# Patient Record
Sex: FEMALE | Race: WHITE | NOT HISPANIC OR LATINO | Employment: OTHER | URBAN - METROPOLITAN AREA
[De-identification: names, ages, dates, MRNs, and addresses within clinical notes are randomized per-mention and may not be internally consistent; named-entity substitution may affect disease eponyms.]

---

## 2017-10-03 ENCOUNTER — HOSPITAL ENCOUNTER (INPATIENT)
Facility: HOSPITAL | Age: 82
LOS: 3 days | DRG: 391 | End: 2017-10-06
Attending: EMERGENCY MEDICINE | Admitting: FAMILY MEDICINE
Payer: MEDICARE

## 2017-10-03 DIAGNOSIS — N39.0 URINARY TRACT INFECTION WITH HEMATURIA, SITE UNSPECIFIED: ICD-10-CM

## 2017-10-03 DIAGNOSIS — R13.10 DYSPHAGIA: ICD-10-CM

## 2017-10-03 DIAGNOSIS — F03.91 DEMENTIA WITH BEHAVIORAL DISTURBANCE, UNSPECIFIED DEMENTIA TYPE: ICD-10-CM

## 2017-10-03 DIAGNOSIS — K22.0 ACHALASIA: ICD-10-CM

## 2017-10-03 DIAGNOSIS — R13.19 ESOPHAGEAL DYSPHAGIA: ICD-10-CM

## 2017-10-03 DIAGNOSIS — R06.02 SOB (SHORTNESS OF BREATH): Primary | ICD-10-CM

## 2017-10-03 DIAGNOSIS — R31.9 URINARY TRACT INFECTION WITH HEMATURIA, SITE UNSPECIFIED: ICD-10-CM

## 2017-10-03 DIAGNOSIS — G93.40 ENCEPHALOPATHY: ICD-10-CM

## 2017-10-03 DIAGNOSIS — K22.2 ESOPHAGEAL OBSTRUCTION: ICD-10-CM

## 2017-10-03 PROBLEM — I10 HTN (HYPERTENSION): Status: ACTIVE | Noted: 2017-10-03

## 2017-10-03 PROBLEM — I50.9 HEART FAILURE: Status: ACTIVE | Noted: 2017-10-03

## 2017-10-03 PROBLEM — F03.90 DEMENTIA: Status: ACTIVE | Noted: 2017-10-03

## 2017-10-03 LAB
ALBUMIN SERPL BCP-MCNC: 2.8 G/DL
ALP SERPL-CCNC: 91 U/L
ALT SERPL W/O P-5'-P-CCNC: 10 U/L
ANION GAP SERPL CALC-SCNC: 10 MMOL/L
AST SERPL-CCNC: 13 U/L
BACTERIA #/AREA URNS HPF: ABNORMAL /HPF
BASOPHILS # BLD AUTO: 0.02 K/UL
BASOPHILS NFR BLD: 0.2 %
BILIRUB SERPL-MCNC: 0.4 MG/DL
BILIRUB UR QL STRIP: NEGATIVE
BNP SERPL-MCNC: 105 PG/ML
BUN SERPL-MCNC: 14 MG/DL
CALCIUM SERPL-MCNC: 8.7 MG/DL
CHLORIDE SERPL-SCNC: 104 MMOL/L
CK SERPL-CCNC: 21 U/L
CLARITY UR: ABNORMAL
CO2 SERPL-SCNC: 23 MMOL/L
COLOR UR: YELLOW
CREAT SERPL-MCNC: 1.1 MG/DL
DIFFERENTIAL METHOD: ABNORMAL
EOSINOPHIL # BLD AUTO: 0.3 K/UL
EOSINOPHIL NFR BLD: 2.5 %
ERYTHROCYTE [DISTWIDTH] IN BLOOD BY AUTOMATED COUNT: 14.9 %
EST. GFR  (AFRICAN AMERICAN): 50 ML/MIN/1.73 M^2
EST. GFR  (NON AFRICAN AMERICAN): 43 ML/MIN/1.73 M^2
GLUCOSE SERPL-MCNC: 128 MG/DL
GLUCOSE UR QL STRIP: NEGATIVE
HCT VFR BLD AUTO: 39.9 %
HGB BLD-MCNC: 13 G/DL
HGB UR QL STRIP: ABNORMAL
KETONES UR QL STRIP: NEGATIVE
LEUKOCYTE ESTERASE UR QL STRIP: ABNORMAL
LYMPHOCYTES # BLD AUTO: 1.7 K/UL
LYMPHOCYTES NFR BLD: 15.8 %
MCH RBC QN AUTO: 27.5 PG
MCHC RBC AUTO-ENTMCNC: 32.6 G/DL
MCV RBC AUTO: 84 FL
MICROSCOPIC COMMENT: ABNORMAL
MONOCYTES # BLD AUTO: 0.6 K/UL
MONOCYTES NFR BLD: 5.3 %
NEUTROPHILS # BLD AUTO: 8.4 K/UL
NEUTROPHILS NFR BLD: 76.2 %
NITRITE UR QL STRIP: NEGATIVE
PH UR STRIP: 6 [PH] (ref 5–8)
PLATELET # BLD AUTO: 232 K/UL
PMV BLD AUTO: 10.8 FL
POTASSIUM SERPL-SCNC: 4.4 MMOL/L
PROT SERPL-MCNC: 6.7 G/DL
PROT UR QL STRIP: ABNORMAL
RBC # BLD AUTO: 4.73 M/UL
RBC #/AREA URNS HPF: 5 /HPF (ref 0–4)
SODIUM SERPL-SCNC: 137 MMOL/L
SP GR UR STRIP: 1.02 (ref 1–1.03)
TROPONIN I SERPL DL<=0.01 NG/ML-MCNC: <0.006 NG/ML
URN SPEC COLLECT METH UR: ABNORMAL
UROBILINOGEN UR STRIP-ACNC: NEGATIVE EU/DL
WBC # BLD AUTO: 11.01 K/UL
WBC #/AREA URNS HPF: >100 /HPF (ref 0–5)

## 2017-10-03 PROCEDURE — 99285 EMERGENCY DEPT VISIT HI MDM: CPT | Mod: 25

## 2017-10-03 PROCEDURE — 63600175 PHARM REV CODE 636 W HCPCS: Performed by: EMERGENCY MEDICINE

## 2017-10-03 PROCEDURE — 82550 ASSAY OF CK (CPK): CPT

## 2017-10-03 PROCEDURE — 83735 ASSAY OF MAGNESIUM: CPT

## 2017-10-03 PROCEDURE — 63600175 PHARM REV CODE 636 W HCPCS: Performed by: NURSE PRACTITIONER

## 2017-10-03 PROCEDURE — 25000003 PHARM REV CODE 250: Performed by: EMERGENCY MEDICINE

## 2017-10-03 PROCEDURE — 93005 ELECTROCARDIOGRAM TRACING: CPT

## 2017-10-03 PROCEDURE — 81000 URINALYSIS NONAUTO W/SCOPE: CPT

## 2017-10-03 PROCEDURE — 11000001 HC ACUTE MED/SURG PRIVATE ROOM

## 2017-10-03 PROCEDURE — 93010 ELECTROCARDIOGRAM REPORT: CPT | Mod: ,,, | Performed by: INTERNAL MEDICINE

## 2017-10-03 PROCEDURE — 83880 ASSAY OF NATRIURETIC PEPTIDE: CPT

## 2017-10-03 PROCEDURE — 25500020 PHARM REV CODE 255: Performed by: EMERGENCY MEDICINE

## 2017-10-03 PROCEDURE — 96374 THER/PROPH/DIAG INJ IV PUSH: CPT

## 2017-10-03 PROCEDURE — 84484 ASSAY OF TROPONIN QUANT: CPT

## 2017-10-03 PROCEDURE — 80053 COMPREHEN METABOLIC PANEL: CPT

## 2017-10-03 PROCEDURE — 85025 COMPLETE CBC W/AUTO DIFF WBC: CPT

## 2017-10-03 PROCEDURE — 84100 ASSAY OF PHOSPHORUS: CPT

## 2017-10-03 RX ORDER — METOPROLOL TARTRATE 1 MG/ML
5 INJECTION, SOLUTION INTRAVENOUS 2 TIMES DAILY
Status: DISCONTINUED | OUTPATIENT
Start: 2017-10-04 | End: 2017-10-03

## 2017-10-03 RX ORDER — HEPARIN SODIUM 5000 [USP'U]/ML
5000 INJECTION, SOLUTION INTRAVENOUS; SUBCUTANEOUS EVERY 8 HOURS
Status: DISCONTINUED | OUTPATIENT
Start: 2017-10-03 | End: 2017-10-06 | Stop reason: HOSPADM

## 2017-10-03 RX ORDER — LORAZEPAM 0.5 MG/1
0.5 TABLET ORAL EVERY 12 HOURS PRN
Status: DISCONTINUED | OUTPATIENT
Start: 2017-10-03 | End: 2017-10-04

## 2017-10-03 RX ORDER — AMLODIPINE BESYLATE 5 MG/1
5 TABLET ORAL NIGHTLY
Status: DISCONTINUED | OUTPATIENT
Start: 2017-10-03 | End: 2017-10-03

## 2017-10-03 RX ORDER — HYDROXYZINE HYDROCHLORIDE 25 MG/1
25 TABLET, FILM COATED ORAL 3 TIMES DAILY PRN
Status: ON HOLD | COMMUNITY
End: 2017-10-06 | Stop reason: HOSPADM

## 2017-10-03 RX ORDER — ATORVASTATIN CALCIUM 10 MG/1
20 TABLET, FILM COATED ORAL NIGHTLY
Status: DISCONTINUED | OUTPATIENT
Start: 2017-10-03 | End: 2017-10-03

## 2017-10-03 RX ORDER — METOPROLOL TARTRATE 1 MG/ML
2.5 INJECTION, SOLUTION INTRAVENOUS EVERY 12 HOURS
Status: DISCONTINUED | OUTPATIENT
Start: 2017-10-04 | End: 2017-10-06 | Stop reason: HOSPADM

## 2017-10-03 RX ORDER — LOSARTAN POTASSIUM 50 MG/1
50 TABLET ORAL DAILY
Status: ON HOLD | COMMUNITY
End: 2017-10-06 | Stop reason: HOSPADM

## 2017-10-03 RX ORDER — ONDANSETRON 2 MG/ML
4 INJECTION INTRAMUSCULAR; INTRAVENOUS EVERY 12 HOURS PRN
Status: DISCONTINUED | OUTPATIENT
Start: 2017-10-03 | End: 2017-10-06 | Stop reason: HOSPADM

## 2017-10-03 RX ORDER — METOPROLOL TARTRATE 25 MG/1
25 TABLET, FILM COATED ORAL 2 TIMES DAILY
Status: DISCONTINUED | OUTPATIENT
Start: 2017-10-03 | End: 2017-10-03

## 2017-10-03 RX ORDER — POLYETHYLENE GLYCOL 3350 17 G/17G
17 POWDER, FOR SOLUTION ORAL DAILY
Status: DISCONTINUED | OUTPATIENT
Start: 2017-10-04 | End: 2017-10-03

## 2017-10-03 RX ORDER — HYDRALAZINE HYDROCHLORIDE 20 MG/ML
10 INJECTION INTRAMUSCULAR; INTRAVENOUS EVERY 8 HOURS PRN
Status: DISCONTINUED | OUTPATIENT
Start: 2017-10-03 | End: 2017-10-06 | Stop reason: HOSPADM

## 2017-10-03 RX ORDER — PANTOPRAZOLE SODIUM 40 MG/10ML
40 INJECTION, POWDER, LYOPHILIZED, FOR SOLUTION INTRAVENOUS DAILY
Status: DISCONTINUED | OUTPATIENT
Start: 2017-10-04 | End: 2017-10-06 | Stop reason: HOSPADM

## 2017-10-03 RX ORDER — TRAMADOL HYDROCHLORIDE 50 MG/1
50 TABLET ORAL EVERY 8 HOURS PRN
COMMUNITY

## 2017-10-03 RX ORDER — FAMOTIDINE 20 MG/50ML
20 INJECTION, SOLUTION INTRAVENOUS DAILY
Status: DISCONTINUED | OUTPATIENT
Start: 2017-10-04 | End: 2017-10-03

## 2017-10-03 RX ORDER — PANTOPRAZOLE SODIUM 40 MG/1
40 TABLET, DELAYED RELEASE ORAL DAILY
Status: DISCONTINUED | OUTPATIENT
Start: 2017-10-04 | End: 2017-10-03

## 2017-10-03 RX ORDER — HALOPERIDOL 5 MG/ML
2.5 INJECTION INTRAMUSCULAR
Status: COMPLETED | OUTPATIENT
Start: 2017-10-03 | End: 2017-10-03

## 2017-10-03 RX ORDER — ALBUTEROL SULFATE 2.5 MG/.5ML
2.5 SOLUTION RESPIRATORY (INHALATION) EVERY 6 HOURS PRN
Status: DISCONTINUED | OUTPATIENT
Start: 2017-10-03 | End: 2017-10-06 | Stop reason: HOSPADM

## 2017-10-03 RX ORDER — NITROFURANTOIN 25; 75 MG/1; MG/1
100 CAPSULE ORAL
Status: COMPLETED | OUTPATIENT
Start: 2017-10-03 | End: 2017-10-03

## 2017-10-03 RX ORDER — TRAMADOL HYDROCHLORIDE 50 MG/1
50 TABLET ORAL EVERY 12 HOURS PRN
Status: DISCONTINUED | OUTPATIENT
Start: 2017-10-03 | End: 2017-10-06 | Stop reason: HOSPADM

## 2017-10-03 RX ORDER — LOSARTAN POTASSIUM 50 MG/1
50 TABLET ORAL DAILY
Status: DISCONTINUED | OUTPATIENT
Start: 2017-10-04 | End: 2017-10-03

## 2017-10-03 RX ORDER — GLUCAGON 1 MG
1 KIT INJECTION
Status: DISCONTINUED | OUTPATIENT
Start: 2017-10-03 | End: 2017-10-06 | Stop reason: HOSPADM

## 2017-10-03 RX ORDER — MEROPENEM AND SODIUM CHLORIDE 500 MG/50ML
500 INJECTION, SOLUTION INTRAVENOUS
Status: DISCONTINUED | OUTPATIENT
Start: 2017-10-04 | End: 2017-10-04

## 2017-10-03 RX ORDER — PAROXETINE 10 MG/1
10 TABLET, FILM COATED ORAL EVERY MORNING
Status: DISCONTINUED | OUTPATIENT
Start: 2017-10-04 | End: 2017-10-03

## 2017-10-03 RX ORDER — IBUPROFEN 200 MG
16 TABLET ORAL
Status: DISCONTINUED | OUTPATIENT
Start: 2017-10-03 | End: 2017-10-06 | Stop reason: HOSPADM

## 2017-10-03 RX ORDER — IBUPROFEN 200 MG
24 TABLET ORAL
Status: DISCONTINUED | OUTPATIENT
Start: 2017-10-03 | End: 2017-10-06 | Stop reason: HOSPADM

## 2017-10-03 RX ORDER — ACETAMINOPHEN 325 MG/1
650 TABLET ORAL EVERY 8 HOURS PRN
Status: DISCONTINUED | OUTPATIENT
Start: 2017-10-03 | End: 2017-10-06 | Stop reason: HOSPADM

## 2017-10-03 RX ORDER — NAPROXEN SODIUM 220 MG/1
81 TABLET, FILM COATED ORAL DAILY
Status: DISCONTINUED | OUTPATIENT
Start: 2017-10-04 | End: 2017-10-06 | Stop reason: HOSPADM

## 2017-10-03 RX ORDER — NITROFURANTOIN 25; 75 MG/1; MG/1
100 CAPSULE ORAL 2 TIMES DAILY
Qty: 14 CAPSULE | Refills: 0 | Status: SHIPPED | OUTPATIENT
Start: 2017-10-03 | End: 2017-10-06 | Stop reason: HOSPADM

## 2017-10-03 RX ORDER — DEXTROSE MONOHYDRATE AND SODIUM CHLORIDE 5; .9 G/100ML; G/100ML
INJECTION, SOLUTION INTRAVENOUS CONTINUOUS
Status: DISCONTINUED | OUTPATIENT
Start: 2017-10-03 | End: 2017-10-04

## 2017-10-03 RX ADMIN — HEPARIN SODIUM 5000 UNITS: 5000 INJECTION, SOLUTION INTRAVENOUS; SUBCUTANEOUS at 11:10

## 2017-10-03 RX ADMIN — DEXTROSE AND SODIUM CHLORIDE: 5; .9 INJECTION, SOLUTION INTRAVENOUS at 11:10

## 2017-10-03 RX ADMIN — HALOPERIDOL LACTATE 2.5 MG: 5 INJECTION, SOLUTION INTRAMUSCULAR at 05:10

## 2017-10-03 RX ADMIN — NITROFURANTOIN (MONOHYDRATE/MACROCRYSTALS) 100 MG: 75; 25 CAPSULE ORAL at 07:10

## 2017-10-03 RX ADMIN — MEROPENEM AND SODIUM CHLORIDE 500 MG: 500 INJECTION, SOLUTION INTRAVENOUS at 11:10

## 2017-10-03 RX ADMIN — IOHEXOL 100 ML: 350 INJECTION, SOLUTION INTRAVENOUS at 06:10

## 2017-10-03 NOTE — ED PROVIDER NOTES
"SCRIBE #1 NOTE: I, Corinne Mack, am scribing for, and in the presence of, Leroy Valle MD. I have scribed the entire note.      History      Chief Complaint   Patient presents with    Cerebrovascular Accident     pt noticed by [facility] not acting nomal around 1130, [facility] was not told what normal was but this is not pt baseline       Review of patient's allergies indicates:   Allergen Reactions    Codeine     Penicillins     Prednisone     Unable to assess      Opiates        HPI   HPI    10/3/2017, 3:11 PM   [facility]      History of Present Illness: Janeth Tamez is a 89 y.o. female patient who presents to the Emergency Department for cerebrovascular accident which onset at 11:30 AM. Symptoms are constant and moderate in severity. Per [facility], pt is not at her baseline. Per pt's family, pt has had a stroke before and she began to act strangely. Pt was coughing up "slime", SOB, and in distress. Pt was brought from [facility]. No prior Tx reported. Pt has Hx of dementia. No further complaints or concerns at this time. HPI limited due to pt AMS and dementia.      Arrival mode: [facility]    PCP: Primary Doctor No       Past Medical History:  Past Medical History:   Diagnosis Date    Arthritis     Degenerative joint disease     Dementia     Diabetes mellitus     HBP (high blood pressure)     Hepatitis C     History of congestive heart failure     History of frequent urinary tract infections     Peripheral neuropathy     Pulmonary embolism        Past Surgical History:  Past Surgical History:   Procedure Laterality Date    2 abdominal surgeries      atopic pregnancy      CATARACT EXTRACTION W/  INTRAOCULAR LENS IMPLANT  OD 10/30/13    CATARACT EXTRACTION W/  INTRAOCULAR LENS IMPLANT  OS 10/22/14    GALLBLADDER SURGERY      IV filter for pulmonary embolism      TONSILLECTOMY, ADENOIDECTOMY           Family History:  Family History " "  Problem Relation Age of Onset    Hypertension Mother     Hypertension Sister     Cancer Sister     Cancer Brother        Social History:  Social History     Social History Main Topics    Smoking status: Never Smoker    Smokeless tobacco: Never Used    Alcohol use No    Drug use: No    Sexual activity: Not on file       ROS   Review of Systems   Unable to perform ROS: Mental status change   Respiratory: Positive for cough and shortness of breath.      Physical Exam      Initial Vitals [10/03/17 1447]   BP Pulse Resp Temp SpO2   (!) 141/78 61 16 97.8 °F (36.6 °C) 96 %      MAP       99          Physical Exam  Nursing Notes and Vital Signs Reviewed.  Constitutional: Patient is in no apparent distress. Well-developed and well-nourished. Elderly.  Head: Atraumatic. Normocephalic.  Eyes: PERRL. EOM intact.   ENT: Mucous membranes are moist. Oropharynx is clear and symmetric.    Neck: Supple. Full ROM.  Cardiovascular: Regular rate. Regular rhythm. No murmurs, rubs, or gallops. Distal pulses are 2+ and symmetric.  Pulmonary/Chest: No respiratory distress. Clear to auscultation bilaterally. No wheezing, rales, or rhonchi.  Abdominal: Soft and non-distended.  There is no tenderness.   Musculoskeletal: Moves all extremities. No obvious deformities. No edema. No calf tenderness.  Skin: Warm and dry.  Neurological:  Alert, awake, and appropriate.  Normal speech.  No acute focal neurological deficits are appreciated.  Psychiatric: Normal affect. Good eye contact. Appropriate in content.     ED Course    Procedures  ED Vital Signs:  Vitals:    10/03/17 1447 10/03/17 1510 10/03/17 1630 10/03/17 1800   BP: (!) 141/78  (!) 150/68 (!) 151/70   Pulse: 61 61 68 78   Resp: 16  18 18   Temp: 97.8 °F (36.6 °C)  98.1 °F (36.7 °C)    TempSrc: Oral  Oral    SpO2: 96%  96%    Weight: 77.1 kg (170 lb)      Height: 5' 4" (1.626 m)          Abnormal Lab Results:  Labs Reviewed   CBC W/ AUTO DIFFERENTIAL - Abnormal; Notable for the " following:        Result Value    RDW 14.9 (*)     Gran # 8.4 (*)     Gran% 76.2 (*)     Lymph% 15.8 (*)     All other components within normal limits   COMPREHENSIVE METABOLIC PANEL - Abnormal; Notable for the following:     Glucose 128 (*)     Albumin 2.8 (*)     eGFR if  50 (*)     eGFR if non  43 (*)     All other components within normal limits   URINALYSIS - Abnormal; Notable for the following:     Appearance, UA Hazy (*)     Protein, UA Trace (*)     Occult Blood UA 2+ (*)     Leukocytes, UA 2+ (*)     All other components within normal limits   B-TYPE NATRIURETIC PEPTIDE - Abnormal; Notable for the following:      (*)     All other components within normal limits   URINALYSIS MICROSCOPIC - Abnormal; Notable for the following:     RBC, UA 5 (*)     WBC, UA >100 (*)     All other components within normal limits   CK   TROPONIN I        All Lab Results:  Results for orders placed or performed during the hospital encounter of 10/03/17   CBC auto differential   Result Value Ref Range    WBC 11.01 3.90 - 12.70 K/uL    RBC 4.73 4.00 - 5.40 M/uL    Hemoglobin 13.0 12.0 - 16.0 g/dL    Hematocrit 39.9 37.0 - 48.5 %    MCV 84 82 - 98 fL    MCH 27.5 27.0 - 31.0 pg    MCHC 32.6 32.0 - 36.0 g/dL    RDW 14.9 (H) 11.5 - 14.5 %    Platelets 232 150 - 350 K/uL    MPV 10.8 9.2 - 12.9 fL    Gran # 8.4 (H) 1.8 - 7.7 K/uL    Lymph # 1.7 1.0 - 4.8 K/uL    Mono # 0.6 0.3 - 1.0 K/uL    Eos # 0.3 0.0 - 0.5 K/uL    Baso # 0.02 0.00 - 0.20 K/uL    Gran% 76.2 (H) 38.0 - 73.0 %    Lymph% 15.8 (L) 18.0 - 48.0 %    Mono% 5.3 4.0 - 15.0 %    Eosinophil% 2.5 0.0 - 8.0 %    Basophil% 0.2 0.0 - 1.9 %    Differential Method Automated    Comprehensive metabolic panel   Result Value Ref Range    Sodium 137 136 - 145 mmol/L    Potassium 4.4 3.5 - 5.1 mmol/L    Chloride 104 95 - 110 mmol/L    CO2 23 23 - 29 mmol/L    Glucose 128 (H) 70 - 110 mg/dL    BUN, Bld 14 10 - 30 mg/dL    Creatinine 1.1 0.5 - 1.4 mg/dL     Calcium 8.7 8.7 - 10.5 mg/dL    Total Protein 6.7 6.0 - 8.4 g/dL    Albumin 2.8 (L) 3.5 - 5.2 g/dL    Total Bilirubin 0.4 0.1 - 1.0 mg/dL    Alkaline Phosphatase 91 55 - 135 U/L    AST 13 10 - 40 U/L    ALT 10 10 - 44 U/L    Anion Gap 10 8 - 16 mmol/L    eGFR if African American 50 (A) >60 mL/min/1.73 m^2    eGFR if non African American 43 (A) >60 mL/min/1.73 m^2   Urinalysis   Result Value Ref Range    Specimen UA Urine, Catheterized     Color, UA Yellow Yellow, Straw, Jigna    Appearance, UA Hazy (A) Clear    pH, UA 6.0 5.0 - 8.0    Specific Gravity, UA 1.025 1.005 - 1.030    Protein, UA Trace (A) Negative    Glucose, UA Negative Negative    Ketones, UA Negative Negative    Bilirubin (UA) Negative Negative    Occult Blood UA 2+ (A) Negative    Nitrite, UA Negative Negative    Urobilinogen, UA Negative <2.0 EU/dL    Leukocytes, UA 2+ (A) Negative   Brain natriuretic peptide   Result Value Ref Range     (H) 0 - 99 pg/mL   CK   Result Value Ref Range    CPK 21 20 - 180 U/L   Troponin I   Result Value Ref Range    Troponin I <0.006 0.000 - 0.026 ng/mL   Urinalysis Microscopic   Result Value Ref Range    RBC, UA 5 (H) 0 - 4 /hpf    WBC, UA >100 (H) 0 - 5 /hpf    Bacteria, UA Occasional None-Occ /hpf    Microscopic Comment SEE COMMENT        Imaging Results:  Imaging Results          CTA Chest Non-Coronary (Final result)  Result time 10/03/17 18:37:25    Final result by Kj Bae III, MD (10/03/17 18:37:25)                 Impression:        1. Severe/massive dilatation of the esophagus filled with food stuff. This is a chronic finding.    2. No gross evidence of acute pulmonary emboli. There is moderately pronounced atheromatous change along the aorta without gross evidence of aneurysm formation.    3. Otherwise as above.      Electronically signed by: KJ BAE MD  Date:     10/03/17  Time:    18:37              Narrative:    CT angiogram of the chest.    Clinical indication: Shortness of  breath.    Standard and MIPS/3-D images submitted.    There is severe/massive dilatation of the esophagus filled with food stuff. This is a chronic finding that has been demonstrated previously.    There is rather pronounced atheromatous change was scattered plaque formation along the aorta. No evidence of aneurysm formation or terri dissection.    There is opacification of the pulmonary arterial system without gross dilatation. There no definite filling defects within the major branches to suggest emboli.    The lungs show chronic changes bilaterally. Cannot exclude mild basilar congestion. Mild scarring. Cardiomegaly noted.    Within the upper portion of the abdomen, there are multiple low attenuation foci in the liver, incompletely seen and severely streak artifact but probably cysts. No acute bony abnormality is suggested.                             X-Ray Chest AP Portable (Final result)  Result time 10/03/17 16:01:31    Final result by Diego Hugo MD (10/03/17 16:01:31)                 Impression:     Superior mediastinal mass as above.      Electronically signed by: DIEGO HUGO MD  Date:     10/03/17  Time:    16:01              Narrative:    History: Cough    Normal heart size.  Prominent curvilinear opacity along the right superior mediastinum is in a location where previously there was markedly air distended esophagus on multiple prior studies, most recent a 3/4/15 chest x-ray.  This is suspicious for mass/superior mediastinal lymphadenopathy, but conceivably could be a markedly distended fluid filled esophagus.  Lung fields otherwise clear.                             CT Head Without Contrast (Final result)  Result time 10/03/17 15:06:15   Procedure changed from CT Head W Wo Contrast     Final result by Adalberto Crystal MD (10/03/17 15:06:15)                 Impression:      Advanced atrophy with white matter degeneration.  No acute findings.        All CT scans at this facility use dose  modulation, iterative reconstruction and/or weight based dosing when appropriate to reduce radiation dose to as low as reasonably achievable.       Electronically signed by: DARLIN MONAE MD  Date:     10/03/17  Time:    15:06              Narrative:    CT HEAD WITHOUT CONTRAST     History:  stroke    Technique:  Noncontrast CT of the brain.     Comparison: 03/05/2012.    Findings:  The ventricles are dilated consistent with generalized atrophy. Associated age-related white matter degeneration is present.     No significant acute findings are noted.                             The EKG was ordered, reviewed, and independently interpreted by the ED provider.  Interpretation time: 1524  Rate: 65 BPM  Rhythm: Junctional rhythm  Interpretation: Minimal voltage criteria for LVH. Septal infarct. No STEMI.           The Emergency Provider reviewed the vital signs and test results, which are outlined above.    ED Discussion     6:51 PM: Reassessed pt at this time. Pt is awake, alert, and in no distress. Discussed with pt all pertinent ED information and results. Discussed pt dx and plan of tx. Gave pt all f/u and return to the ED instructions. All questions and concerns were addressed at this time. Pt expresses understanding of information and instructions, and is comfortable with plan to discharge. Pt is stable for discharge.    I discussed with patient and/or family/caretaker that evaluation in the ED does not suggest any emergent or life threatening medical conditions requiring immediate intervention beyond what was provided in the ED, and I believe patient is safe for discharge.  Regardless, an unremarkable evaluation in the ED does not preclude the development or presence of a serious of life threatening condition. As such, patient was instructed to return immediately for any worsening or change in current symptoms.      ED Medication(s):  Medications   haloperidol lactate injection 2.5 mg (2.5 mg Intravenous Given  10/3/17 1757)   omnipaque 350 iohexol 100 mL (100 mLs Intravenous Given 10/3/17 1818)       New Prescriptions    NITROFURANTOIN, MACROCRYSTAL-MONOHYDRATE, (MACROBID) 100 MG CAPSULE    Take 1 capsule (100 mg total) by mouth 2 (two) times daily.       Follow-up Information     PCP In 2 days.    Contact information:  [phone]                   Medical Decision Making    Medical Decision Making:   Clinical Tests:   Lab Tests: Ordered and Reviewed  Radiological Study: Ordered and Reviewed  Medical Tests: Ordered and Reviewed           Scribe Attestation:   Scribe #1: I performed the above scribed service and the documentation accurately describes the services I performed. I attest to the accuracy of the note.    Attending:   Physician Attestation Statement for Scribe #1: I, Leroy Valle MD, personally performed the services described in this documentation, as scribed by Corinne Mack, in my presence, and it is both accurate and complete.          Clinical Impression       ICD-10-CM ICD-9-CM   1. SOB (shortness of breath) R06.02 786.05   2. Urinary tract infection with hematuria, site unspecified N39.0 599.0    R31.9        Disposition:   Disposition: Discharged  Condition: Stable         Leroy Valle MD  10/03/17 9107

## 2017-10-03 NOTE — ED NOTES
Received report from CARLY De Leon. Pt in NAD,VSS, RR equal and unlabored. Pt awaiting test results and disposition. Pt's bed is low, locked, and call light in reach. SR up x 2. Will continue to monitor pt.

## 2017-10-04 PROBLEM — N30.00 ACUTE CYSTITIS: Status: ACTIVE | Noted: 2017-10-03

## 2017-10-04 LAB
ALBUMIN SERPL BCP-MCNC: 2.9 G/DL
ALBUMIN SERPL BCP-MCNC: 2.9 G/DL
ALP SERPL-CCNC: 96 U/L
ALP SERPL-CCNC: 96 U/L
ALT SERPL W/O P-5'-P-CCNC: 10 U/L
ALT SERPL W/O P-5'-P-CCNC: 10 U/L
ANION GAP SERPL CALC-SCNC: 7 MMOL/L
ANION GAP SERPL CALC-SCNC: 9 MMOL/L
APTT BLDCRRT: 28.9 SEC
AST SERPL-CCNC: 12 U/L
AST SERPL-CCNC: 12 U/L
BASOPHILS # BLD AUTO: 0.02 K/UL
BASOPHILS # BLD AUTO: 0.02 K/UL
BASOPHILS NFR BLD: 0.2 %
BASOPHILS NFR BLD: 0.2 %
BILIRUB SERPL-MCNC: 0.4 MG/DL
BILIRUB SERPL-MCNC: 0.4 MG/DL
BUN SERPL-MCNC: 13 MG/DL
BUN SERPL-MCNC: 13 MG/DL
CALCIUM SERPL-MCNC: 9.3 MG/DL
CALCIUM SERPL-MCNC: 9.3 MG/DL
CHLORIDE SERPL-SCNC: 101 MMOL/L
CHLORIDE SERPL-SCNC: 101 MMOL/L
CO2 SERPL-SCNC: 28 MMOL/L
CO2 SERPL-SCNC: 29 MMOL/L
CREAT SERPL-MCNC: 1 MG/DL
CREAT SERPL-MCNC: 1 MG/DL
DIFFERENTIAL METHOD: ABNORMAL
DIFFERENTIAL METHOD: ABNORMAL
EOSINOPHIL # BLD AUTO: 0.3 K/UL
EOSINOPHIL # BLD AUTO: 0.3 K/UL
EOSINOPHIL NFR BLD: 2.5 %
EOSINOPHIL NFR BLD: 2.7 %
ERYTHROCYTE [DISTWIDTH] IN BLOOD BY AUTOMATED COUNT: 15 %
ERYTHROCYTE [DISTWIDTH] IN BLOOD BY AUTOMATED COUNT: 15.1 %
EST. GFR  (AFRICAN AMERICAN): 56 ML/MIN/1.73 M^2
EST. GFR  (AFRICAN AMERICAN): 56 ML/MIN/1.73 M^2
EST. GFR  (NON AFRICAN AMERICAN): 48 ML/MIN/1.73 M^2
EST. GFR  (NON AFRICAN AMERICAN): 48 ML/MIN/1.73 M^2
ESTIMATED AVG GLUCOSE: 140 MG/DL
GLUCOSE SERPL-MCNC: 161 MG/DL
GLUCOSE SERPL-MCNC: 162 MG/DL
HBA1C MFR BLD HPLC: 6.5 %
HCT VFR BLD AUTO: 40.3 %
HCT VFR BLD AUTO: 40.4 %
HGB BLD-MCNC: 13.1 G/DL
HGB BLD-MCNC: 13.1 G/DL
LYMPHOCYTES # BLD AUTO: 2.2 K/UL
LYMPHOCYTES # BLD AUTO: 2.3 K/UL
LYMPHOCYTES NFR BLD: 18.3 %
LYMPHOCYTES NFR BLD: 18.7 %
MAGNESIUM SERPL-MCNC: 1.8 MG/DL
MCH RBC QN AUTO: 27.5 PG
MCH RBC QN AUTO: 27.6 PG
MCHC RBC AUTO-ENTMCNC: 32.4 G/DL
MCHC RBC AUTO-ENTMCNC: 32.5 G/DL
MCV RBC AUTO: 85 FL
MCV RBC AUTO: 85 FL
MONOCYTES # BLD AUTO: 0.8 K/UL
MONOCYTES # BLD AUTO: 0.8 K/UL
MONOCYTES NFR BLD: 6.4 %
MONOCYTES NFR BLD: 6.5 %
NEUTROPHILS # BLD AUTO: 8.7 K/UL
NEUTROPHILS # BLD AUTO: 9 K/UL
NEUTROPHILS NFR BLD: 72.3 %
NEUTROPHILS NFR BLD: 72.4 %
PHOSPHATE SERPL-MCNC: 3.8 MG/DL
PLATELET # BLD AUTO: 236 K/UL
PLATELET # BLD AUTO: 255 K/UL
PMV BLD AUTO: 10.4 FL
PMV BLD AUTO: 10.6 FL
POCT GLUCOSE: 157 MG/DL (ref 70–110)
POCT GLUCOSE: 158 MG/DL (ref 70–110)
POCT GLUCOSE: 179 MG/DL (ref 70–110)
POTASSIUM SERPL-SCNC: 3.9 MMOL/L
POTASSIUM SERPL-SCNC: 4 MMOL/L
PROT SERPL-MCNC: 7 G/DL
PROT SERPL-MCNC: 7 G/DL
RBC # BLD AUTO: 4.75 M/UL
RBC # BLD AUTO: 4.76 M/UL
SODIUM SERPL-SCNC: 137 MMOL/L
SODIUM SERPL-SCNC: 138 MMOL/L
WBC # BLD AUTO: 11.99 K/UL
WBC # BLD AUTO: 12.49 K/UL

## 2017-10-04 PROCEDURE — 99233 SBSQ HOSP IP/OBS HIGH 50: CPT | Mod: ,,, | Performed by: INTERNAL MEDICINE

## 2017-10-04 PROCEDURE — 36415 COLL VENOUS BLD VENIPUNCTURE: CPT

## 2017-10-04 PROCEDURE — 63600175 PHARM REV CODE 636 W HCPCS: Performed by: NURSE PRACTITIONER

## 2017-10-04 PROCEDURE — 85730 THROMBOPLASTIN TIME PARTIAL: CPT

## 2017-10-04 PROCEDURE — 80053 COMPREHEN METABOLIC PANEL: CPT | Mod: 91

## 2017-10-04 PROCEDURE — 83036 HEMOGLOBIN GLYCOSYLATED A1C: CPT

## 2017-10-04 PROCEDURE — 87186 SC STD MICRODIL/AGAR DIL: CPT

## 2017-10-04 PROCEDURE — 25000003 PHARM REV CODE 250: Performed by: NURSE PRACTITIONER

## 2017-10-04 PROCEDURE — 97802 MEDICAL NUTRITION INDIV IN: CPT

## 2017-10-04 PROCEDURE — 92612 ENDOSCOPY SWALLOW (FEES) VID: CPT

## 2017-10-04 PROCEDURE — 85025 COMPLETE CBC W/AUTO DIFF WBC: CPT | Mod: 91

## 2017-10-04 PROCEDURE — 80053 COMPREHEN METABOLIC PANEL: CPT

## 2017-10-04 PROCEDURE — V5362 SPEECH SCREENING: HCPCS

## 2017-10-04 PROCEDURE — 87077 CULTURE AEROBIC IDENTIFY: CPT

## 2017-10-04 PROCEDURE — 11000001 HC ACUTE MED/SURG PRIVATE ROOM

## 2017-10-04 PROCEDURE — 63600175 PHARM REV CODE 636 W HCPCS: Performed by: EMERGENCY MEDICINE

## 2017-10-04 PROCEDURE — C9113 INJ PANTOPRAZOLE SODIUM, VIA: HCPCS | Performed by: NURSE PRACTITIONER

## 2017-10-04 PROCEDURE — 96372 THER/PROPH/DIAG INJ SC/IM: CPT

## 2017-10-04 PROCEDURE — 87086 URINE CULTURE/COLONY COUNT: CPT

## 2017-10-04 PROCEDURE — 21400001 HC TELEMETRY ROOM

## 2017-10-04 PROCEDURE — 87088 URINE BACTERIA CULTURE: CPT

## 2017-10-04 RX ORDER — CIPROFLOXACIN 2 MG/ML
400 INJECTION, SOLUTION INTRAVENOUS
Status: DISCONTINUED | OUTPATIENT
Start: 2017-10-04 | End: 2017-10-06

## 2017-10-04 RX ORDER — SODIUM CHLORIDE 9 MG/ML
INJECTION, SOLUTION INTRAVENOUS CONTINUOUS
Status: DISCONTINUED | OUTPATIENT
Start: 2017-10-04 | End: 2017-10-06 | Stop reason: HOSPADM

## 2017-10-04 RX ORDER — MEROPENEM AND SODIUM CHLORIDE 500 MG/50ML
500 INJECTION, SOLUTION INTRAVENOUS
Status: DISCONTINUED | OUTPATIENT
Start: 2017-10-04 | End: 2017-10-04

## 2017-10-04 RX ADMIN — METOPROLOL TARTRATE 2.5 MG: 5 INJECTION INTRAVENOUS at 09:10

## 2017-10-04 RX ADMIN — HEPARIN SODIUM 5000 UNITS: 5000 INJECTION, SOLUTION INTRAVENOUS; SUBCUTANEOUS at 09:10

## 2017-10-04 RX ADMIN — HEPARIN SODIUM 5000 UNITS: 5000 INJECTION, SOLUTION INTRAVENOUS; SUBCUTANEOUS at 01:10

## 2017-10-04 RX ADMIN — PANTOPRAZOLE SODIUM 40 MG: 40 INJECTION, POWDER, FOR SOLUTION INTRAVENOUS at 09:10

## 2017-10-04 RX ADMIN — CIPROFLOXACIN 400 MG: 2 INJECTION, SOLUTION INTRAVENOUS at 03:10

## 2017-10-04 RX ADMIN — MEROPENEM AND SODIUM CHLORIDE 500 MG: 500 INJECTION, SOLUTION INTRAVENOUS at 05:10

## 2017-10-04 RX ADMIN — MEROPENEM AND SODIUM CHLORIDE 500 MG: 500 INJECTION, SOLUTION INTRAVENOUS at 01:10

## 2017-10-04 RX ADMIN — LORAZEPAM 0.5 MG: 2 INJECTION INTRAMUSCULAR; INTRAVENOUS at 09:10

## 2017-10-04 RX ADMIN — SODIUM CHLORIDE: 0.9 INJECTION, SOLUTION INTRAVENOUS at 04:10

## 2017-10-04 NOTE — SUBJECTIVE & OBJECTIVE
Past Medical History:   Diagnosis Date    Arthritis     Degenerative joint disease     Dementia     Diabetes mellitus     HBP (high blood pressure)     Hepatitis C     History of congestive heart failure     History of frequent urinary tract infections     Peripheral neuropathy     Pulmonary embolism        Past Surgical History:   Procedure Laterality Date    2 abdominal surgeries      atopic pregnancy      CATARACT EXTRACTION W/  INTRAOCULAR LENS IMPLANT  OD 10/30/13    CATARACT EXTRACTION W/  INTRAOCULAR LENS IMPLANT  OS 10/22/14    GALLBLADDER SURGERY      IV filter for pulmonary embolism      TONSILLECTOMY, ADENOIDECTOMY         Review of patient's allergies indicates:   Allergen Reactions    Codeine     Penicillins     Prednisone     Unable to assess      Opiates       No current facility-administered medications on file prior to encounter.      Current Outpatient Prescriptions on File Prior to Encounter   Medication Sig    albuterol (ACCUNEB) 1.25 mg/3 mL Nebu Take 2.5 mg by nebulization every 6 (six) hours as needed.     amlodipine (NORVASC) 5 MG tablet Take 1 tablet (5 mg total) by mouth once daily.    ascorbic acid (VITAMIN C) 500 MG tablet Take 500 mg by mouth 2 (two) times daily.    ASPIRIN ORAL Take 81 mg by mouth once daily.     atorvastatin (LIPITOR) 20 MG tablet Take 1 tablet (20 mg total) by mouth every evening.    cyanocobalamin, vitamin B-12, 1,000 mcg TbSR Take by mouth once daily.    loratadine (CLARITIN) 10 mg tablet Take 10 mg by mouth once daily.    lorazepam (ATIVAN) 1 MG tablet 2 (two) times daily.     metoprolol tartrate (LOPRESSOR) 25 MG tablet     paroxetine (PAXIL) 20 MG tablet Take 10 mg by mouth every morning.     POLYETHYLENE GLYCOL 3350 (MIRALAX ORAL) Take by mouth.    PROTEIN SUPPLEMENT (PROMOD PROTEIN ORAL) Take by mouth.    senna (SENOKOT) 8.6 mg tablet Take 1 tablet by mouth once daily.    [DISCONTINUED] polyethylene glycol (GLYCOLAX) 17  gram/dose powder     hydrocodone-acetaminophen 5-325mg (NORCO) 5-325 mg per tablet Take 1 tablet by mouth every 4 (four) hours as needed for Pain.    [DISCONTINUED] olmesartan (BENICAR) 40 MG tablet Take 40 mg by mouth once daily.    [DISCONTINUED] olmesartan (BENICAR) 40 MG tablet      Family History     Problem Relation (Age of Onset)    Cancer Sister, Brother    Hypertension Mother, Sister        Social History Main Topics    Smoking status: Never Smoker    Smokeless tobacco: Never Used    Alcohol use No    Drug use: No    Sexual activity: Not on file     Review of Systems   Unable to perform ROS: Dementia     Objective:     Vital Signs (Most Recent):  Temp: 98.6 °F (37 °C) (10/03/17 2231)  Pulse: 79 (10/03/17 2231)  Resp: 16 (10/03/17 2231)  BP: (!) 177/78 (10/03/17 2231)  SpO2: 95 % (10/03/17 2231) Vital Signs (24h Range):  Temp:  [97.8 °F (36.6 °C)-98.8 °F (37.1 °C)] 98.6 °F (37 °C)  Pulse:  [61-81] 79  Resp:  [16-20] 16  SpO2:  [95 %-98 %] 95 %  BP: (134-177)/(61-78) 177/78     Weight: 81 kg (178 lb 9.2 oz)  Body mass index is 30.65 kg/m².    Physical Exam   Constitutional: No distress.   Frail, thin, demented elderly female    HENT:   Head: Normocephalic and atraumatic.   Nose: Nose normal.   Eyes: Conjunctivae and EOM are normal. Pupils are equal, round, and reactive to light. No scleral icterus.   Neck: Normal range of motion. Neck supple. No tracheal deviation present.   Cardiovascular: Normal rate, regular rhythm, normal heart sounds and intact distal pulses.    No murmur heard.  Pulmonary/Chest: Effort normal. No stridor. No respiratory distress. She has no wheezes. She has rales ( /ronchi bilaterally).   Abdominal: Soft. Bowel sounds are normal. She exhibits no distension. There is no tenderness. There is no guarding.   Genitourinary:   Genitourinary Comments: brief     Musculoskeletal: Normal range of motion. She exhibits no edema or deformity.   Generalized weakness     Neurological: She is  alert. No cranial nerve deficit.   Confused, Oriented to self only  incomprehensible speech only  Able to follow some simple commands.     Skin: Skin is warm and dry. Capillary refill takes 2 to 3 seconds. No rash noted. She is not diaphoretic.   Psychiatric:   Unable to assess      Nursing note and vitals reviewed.       Significant Labs: All pertinent labs within the past 24 hours have been reviewed.   Results for orders placed or performed during the hospital encounter of 10/03/17   CBC auto differential   Result Value Ref Range    WBC 11.01 3.90 - 12.70 K/uL    RBC 4.73 4.00 - 5.40 M/uL    Hemoglobin 13.0 12.0 - 16.0 g/dL    Hematocrit 39.9 37.0 - 48.5 %    MCV 84 82 - 98 fL    MCH 27.5 27.0 - 31.0 pg    MCHC 32.6 32.0 - 36.0 g/dL    RDW 14.9 (H) 11.5 - 14.5 %    Platelets 232 150 - 350 K/uL    MPV 10.8 9.2 - 12.9 fL    Gran # 8.4 (H) 1.8 - 7.7 K/uL    Lymph # 1.7 1.0 - 4.8 K/uL    Mono # 0.6 0.3 - 1.0 K/uL    Eos # 0.3 0.0 - 0.5 K/uL    Baso # 0.02 0.00 - 0.20 K/uL    Gran% 76.2 (H) 38.0 - 73.0 %    Lymph% 15.8 (L) 18.0 - 48.0 %    Mono% 5.3 4.0 - 15.0 %    Eosinophil% 2.5 0.0 - 8.0 %    Basophil% 0.2 0.0 - 1.9 %    Differential Method Automated    Comprehensive metabolic panel   Result Value Ref Range    Sodium 137 136 - 145 mmol/L    Potassium 4.4 3.5 - 5.1 mmol/L    Chloride 104 95 - 110 mmol/L    CO2 23 23 - 29 mmol/L    Glucose 128 (H) 70 - 110 mg/dL    BUN, Bld 14 10 - 30 mg/dL    Creatinine 1.1 0.5 - 1.4 mg/dL    Calcium 8.7 8.7 - 10.5 mg/dL    Total Protein 6.7 6.0 - 8.4 g/dL    Albumin 2.8 (L) 3.5 - 5.2 g/dL    Total Bilirubin 0.4 0.1 - 1.0 mg/dL    Alkaline Phosphatase 91 55 - 135 U/L    AST 13 10 - 40 U/L    ALT 10 10 - 44 U/L    Anion Gap 10 8 - 16 mmol/L    eGFR if African American 50 (A) >60 mL/min/1.73 m^2    eGFR if non African American 43 (A) >60 mL/min/1.73 m^2   Urinalysis   Result Value Ref Range    Specimen UA Urine, Catheterized     Color, UA Yellow Yellow, Straw, Jigna    Appearance, UA  Hazy (A) Clear    pH, UA 6.0 5.0 - 8.0    Specific Gravity, UA 1.025 1.005 - 1.030    Protein, UA Trace (A) Negative    Glucose, UA Negative Negative    Ketones, UA Negative Negative    Bilirubin (UA) Negative Negative    Occult Blood UA 2+ (A) Negative    Nitrite, UA Negative Negative    Urobilinogen, UA Negative <2.0 EU/dL    Leukocytes, UA 2+ (A) Negative   Brain natriuretic peptide   Result Value Ref Range     (H) 0 - 99 pg/mL   CK   Result Value Ref Range    CPK 21 20 - 180 U/L   Troponin I   Result Value Ref Range    Troponin I <0.006 0.000 - 0.026 ng/mL   Urinalysis Microscopic   Result Value Ref Range    RBC, UA 5 (H) 0 - 4 /hpf    WBC, UA >100 (H) 0 - 5 /hpf    Bacteria, UA Occasional None-Occ /hpf    Microscopic Comment SEE COMMENT          Significant Imaging: I have reviewed all pertinent imaging results/findings within the past 24 hours.   Imaging Results          CTA Chest Non-Coronary (Final result)  Result time 10/03/17 18:37:25    Final result by Kj Bae III, MD (10/03/17 18:37:25)                 Impression:        1. Severe/massive dilatation of the esophagus filled with food stuff. This is a chronic finding.    2. No gross evidence of acute pulmonary emboli. There is moderately pronounced atheromatous change along the aorta without gross evidence of aneurysm formation.    3. Otherwise as above.      Electronically signed by: KJ BAE MD  Date:     10/03/17  Time:    18:37              Narrative:    CT angiogram of the chest.    Clinical indication: Shortness of breath.    Standard and MIPS/3-D images submitted.    There is severe/massive dilatation of the esophagus filled with food stuff. This is a chronic finding that has been demonstrated previously.    There is rather pronounced atheromatous change was scattered plaque formation along the aorta. No evidence of aneurysm formation or terri dissection.    There is opacification of the pulmonary arterial system  without gross dilatation. There no definite filling defects within the major branches to suggest emboli.    The lungs show chronic changes bilaterally. Cannot exclude mild basilar congestion. Mild scarring. Cardiomegaly noted.    Within the upper portion of the abdomen, there are multiple low attenuation foci in the liver, incompletely seen and severely streak artifact but probably cysts. No acute bony abnormality is suggested.                             X-Ray Chest AP Portable (Final result)  Result time 10/03/17 16:01:31    Final result by Diego Hugo MD (10/03/17 16:01:31)                 Impression:     Superior mediastinal mass as above.      Electronically signed by: DIEGO HUGO MD  Date:     10/03/17  Time:    16:01              Narrative:    History: Cough    Normal heart size.  Prominent curvilinear opacity along the right superior mediastinum is in a location where previously there was markedly air distended esophagus on multiple prior studies, most recent a 3/4/15 chest x-ray.  This is suspicious for mass/superior mediastinal lymphadenopathy, but conceivably could be a markedly distended fluid filled esophagus.  Lung fields otherwise clear.                             CT Head Without Contrast (Final result)  Result time 10/03/17 15:06:15   Procedure changed from CT Head W Wo Contrast     Final result by Dalrin Monae MD (10/03/17 15:06:15)                 Impression:      Advanced atrophy with white matter degeneration.  No acute findings.        All CT scans at this facility use dose modulation, iterative reconstruction and/or weight based dosing when appropriate to reduce radiation dose to as low as reasonably achievable.       Electronically signed by: DARLIN MONAE MD  Date:     10/03/17  Time:    15:06              Narrative:    CT HEAD WITHOUT CONTRAST     History:  stroke    Technique:  Noncontrast CT of the brain.     Comparison: 03/05/2012.    Findings:  The ventricles are  dilated consistent with generalized atrophy. Associated age-related white matter degeneration is present.     No significant acute findings are noted.

## 2017-10-04 NOTE — SUBJECTIVE & OBJECTIVE
Past Medical History:   Diagnosis Date    Arthritis     Degenerative joint disease     Dementia     Diabetes mellitus     HBP (high blood pressure)     Hepatitis C     History of congestive heart failure     History of frequent urinary tract infections     Peripheral neuropathy     Pulmonary embolism        Past Surgical History:   Procedure Laterality Date    2 abdominal surgeries      atopic pregnancy      CATARACT EXTRACTION W/  INTRAOCULAR LENS IMPLANT  OD 10/30/13    CATARACT EXTRACTION W/  INTRAOCULAR LENS IMPLANT  OS 10/22/14    GALLBLADDER SURGERY      IV filter for pulmonary embolism      TONSILLECTOMY, ADENOIDECTOMY         Review of patient's allergies indicates:   Allergen Reactions    Codeine     Penicillins     Prednisone     Unable to assess      Opiates     Family History     Problem Relation (Age of Onset)    Cancer Sister, Brother    Hypertension Mother, Sister        Social History Main Topics    Smoking status: Never Smoker    Smokeless tobacco: Never Used    Alcohol use No    Drug use: No    Sexual activity: Not on file     Review of Systems   Unable to perform ROS: Dementia     Objective:     Vital Signs (Most Recent):  Temp: 98.4 °F (36.9 °C) (10/04/17 1233)  Pulse: 83 (10/04/17 1233)  Resp: 18 (10/04/17 1233)  BP: (!) 180/79 (10/04/17 1233)  SpO2: 97 % (10/04/17 1233) Vital Signs (24h Range):  Temp:  [97.4 °F (36.3 °C)-98.8 °F (37.1 °C)] 98.4 °F (36.9 °C)  Pulse:  [61-83] 83  Resp:  [16-20] 18  SpO2:  [94 %-98 %] 97 %  BP: (134-183)/(61-84) 180/79     Weight: 81 kg (178 lb 9.2 oz) (10/04/17 1047)  Body mass index is 30.64 kg/m².      Intake/Output Summary (Last 24 hours) at 10/04/17 1446  Last data filed at 10/04/17 1200   Gross per 24 hour   Intake            572.5 ml   Output                0 ml   Net            572.5 ml       Lines/Drains/Airways     Peripheral Intravenous Line                 Peripheral IV - Single Lumen 10/03/17 1449 Left Forearm less than 1 day                 Physical Exam   Constitutional: She appears well-developed. No distress.   HENT:   Head: Normocephalic.   Eyes: Pupils are equal, round, and reactive to light.   Cardiovascular: Normal rate and regular rhythm.  Exam reveals no friction rub.    No murmur heard.  Pulmonary/Chest: Effort normal and breath sounds normal. No respiratory distress. She has no wheezes.   Abdominal: Soft. Bowel sounds are normal. She exhibits no distension. There is no tenderness.   Neurological: She is alert.   confused       Significant Labs:  CBC:   Recent Labs  Lab 10/03/17  1541 10/04/17  0518   WBC 11.01 12.49  11.99   HGB 13.0 13.1  13.1   HCT 39.9 40.4  40.3    255  236     CMP:   Recent Labs  Lab 10/04/17  0518   *   CALCIUM 9.3   ALBUMIN 2.9*   PROT 7.0      K 4.0   CO2 28      BUN 13   CREATININE 1.0   ALKPHOS 96   ALT 10   AST 12   BILITOT 0.4       Significant Imaging:  Imaging results within the past 24 hours have been reviewed.

## 2017-10-04 NOTE — PLAN OF CARE
Call received form patient's spouse who would like the patient transferred to [facility] emergency room. He would like a second opinion. I informed him that we can not just send her to the ER that is against regulations and laws. I asked if he had an accepting physician and he stated that he spoke with [facility] and they were the ones who suggested to send her to [facility] ER. I also informed him that his insurance may not authorize the transfer related not transferring to a higher acuity. Patient stated that he will work on this in the am. Update to Chrystal Coon NP.

## 2017-10-04 NOTE — ASSESSMENT & PLAN NOTE
-NPO  -CTA Chest reviewed: 1. Severe/massive dilatation of the esophagus filled with food stuff. This is a chronic finding..  Await GI evaluation   -monitor for aspiration .

## 2017-10-04 NOTE — ED NOTES
Pt's family gave pt coffee to drink. Pt unable to tolerate PO. Pt spitting up and drooling. Pt's voice sounds wet.  notified. [place]'s tech sent back to nursing home. Pt to be admitted.

## 2017-10-04 NOTE — ASSESSMENT & PLAN NOTE
-Family notes history, and is on thickened liquid at nursing home.   -NPO  -Swallow study, Speech eval and treat  -aspiration precautions

## 2017-10-04 NOTE — HPI
"Janeth Tamez is a 89 y.o. female patient, with history of dementia, who presented to the Emergency Department for AMS which onset at 11:30 AM. Symptoms are constant and moderate in severity. Per [facility], pt is not at her baseline. Per pt's family, pt has had a stroke before and she began to act strangely today after, Pt was coughing up "slime", SOB, and in distress. The patient was sent from [facility] for further treatment. The patient was evaluated in the ER and found to have a UTI, was treated and being discharged, when the patient began to vomit after oral intake in the Er. The patient was re evaluated by Dr. Kim evaluated. Dr. Kim discussed with the patient's  possible forms of treatment, including placement of a G Tube. Pt's  verbalizes consent at this time for G Tube placement. Dr. Kim discussed the pt's case with Dr. Clark (GI) who states that pt's sxs are most likely related to achalasia or a disrupted esophagus, in which case a feeding tube will need to be placed by interventional radiology. Dr. Clark states that he will f/u with the pt in the AM..   No further complaints or concerns at this time. HPI limited due to pt AMS and dementia.    "

## 2017-10-04 NOTE — SUBJECTIVE & OBJECTIVE
Review of Systems   Unable to perform ROS: Dementia     Objective:     Vital Signs (Most Recent):  Temp: 98.4 °F (36.9 °C) (10/04/17 1233)  Pulse: 83 (10/04/17 1233)  Resp: 18 (10/04/17 1233)  BP: (!) 180/79 (10/04/17 1233)  SpO2: 97 % (10/04/17 1233) Vital Signs (24h Range):  Temp:  [97.4 °F (36.3 °C)-98.8 °F (37.1 °C)] 98.4 °F (36.9 °C)  Pulse:  [61-83] 83  Resp:  [16-20] 18  SpO2:  [94 %-98 %] 97 %  BP: (134-183)/(61-84) 180/79     Weight: 81 kg (178 lb 9.2 oz)  Body mass index is 30.64 kg/m².    Intake/Output Summary (Last 24 hours) at 10/04/17 1443  Last data filed at 10/04/17 1200   Gross per 24 hour   Intake            572.5 ml   Output                0 ml   Net            572.5 ml      Physical Exam   Constitutional: She appears well-developed and well-nourished. No distress.   HENT:   Head: Normocephalic and atraumatic.   Nose: Nose normal.   Mouth/Throat: Oropharynx is clear and moist.   Eyes: Conjunctivae are normal. No scleral icterus.   Neck: Neck supple. No tracheal deviation present.   Cardiovascular: Normal rate, regular rhythm, normal heart sounds and intact distal pulses.  Exam reveals no gallop and no friction rub.    No murmur heard.  Pulmonary/Chest: Effort normal and breath sounds normal. No stridor. No respiratory distress. She has no wheezes. She has no rales. She exhibits no tenderness.   Upper airway congestion    Abdominal: Soft. Bowel sounds are normal. She exhibits no distension and no mass. There is no tenderness. There is no rebound and no guarding.   Musculoskeletal: She exhibits no edema, tenderness or deformity.   Neurological: She is alert. No cranial nerve deficit. She exhibits normal muscle tone. Coordination normal.   Oriented to self only    Skin: Skin is warm and dry. No rash noted. She is not diaphoretic. No erythema. No pallor.   Psychiatric: She has a normal mood and affect. Her behavior is normal.   Nursing note and vitals reviewed.      Significant Labs:   BMP:    Recent Labs  Lab 10/03/17  1541  10/04/17  0518   *  < > 162*     < > 138   K 4.4  < > 4.0     < > 101   CO2 23  < > 28   BUN 14  < > 13   CREATININE 1.1  < > 1.0   CALCIUM 8.7  < > 9.3   MG 1.8  --   --    < > = values in this interval not displayed.  CBC:   Recent Labs  Lab 10/03/17  1541 10/04/17  0518   WBC 11.01 12.49  11.99   HGB 13.0 13.1  13.1   HCT 39.9 40.4  40.3    255  236     CMP:   Recent Labs  Lab 10/03/17  1541 10/04/17  0517 10/04/17  0518    137 138   K 4.4 3.9 4.0    101 101   CO2 23 29 28   * 161* 162*   BUN 14 13 13   CREATININE 1.1 1.0 1.0   CALCIUM 8.7 9.3 9.3   PROT 6.7 7.0 7.0   ALBUMIN 2.8* 2.9* 2.9*   BILITOT 0.4 0.4 0.4   ALKPHOS 91 96 96   AST 13 12 12   ALT 10 10 10   ANIONGAP 10 7* 9   EGFRNONAA 43* 48* 48*     All pertinent labs within the past 24 hours have been reviewed.    Significant Imaging:   Imaging Results          X-Ray Chest 1 View (Final result)  Result time 10/04/17 08:28:01   Procedure changed from Chest X-ray, PA And Lateral     Final result by Kj Rizvi MD (10/04/17 08:28:01)                 Impression:     No adverse interval change      Electronically signed by: KJ RIZVI MD  Date:     10/04/17  Time:    08:28              Narrative:    History: Aspiration risk    Comparison: 10/3/17    Result: Single view of the chest.       Right paratracheal stripe thickening which correlates with large amount of material within the esophagus on CT exam. In comparison to the prior study, there is no adverse interval changes.                             CTA Chest Non-Coronary (Final result)  Result time 10/03/17 18:37:25    Final result by Kj Bae III, MD (10/03/17 18:37:25)                 Impression:        1. Severe/massive dilatation of the esophagus filled with food stuff. This is a chronic finding.    2. No gross evidence of acute pulmonary emboli. There is moderately pronounced atheromatous change along  the aorta without gross evidence of aneurysm formation.    3. Otherwise as above.      Electronically signed by: ADALID GUADALUPE MD  Date:     10/03/17  Time:    18:37              Narrative:    CT angiogram of the chest.    Clinical indication: Shortness of breath.    Standard and MIPS/3-D images submitted.    There is severe/massive dilatation of the esophagus filled with food stuff. This is a chronic finding that has been demonstrated previously.    There is rather pronounced atheromatous change was scattered plaque formation along the aorta. No evidence of aneurysm formation or terri dissection.    There is opacification of the pulmonary arterial system without gross dilatation. There no definite filling defects within the major branches to suggest emboli.    The lungs show chronic changes bilaterally. Cannot exclude mild basilar congestion. Mild scarring. Cardiomegaly noted.    Within the upper portion of the abdomen, there are multiple low attenuation foci in the liver, incompletely seen and severely streak artifact but probably cysts. No acute bony abnormality is suggested.                             X-Ray Chest AP Portable (Final result)  Result time 10/03/17 16:01:31    Final result by Diego Hugo MD (10/03/17 16:01:31)                 Impression:     Superior mediastinal mass as above.      Electronically signed by: DIEGO HUGO MD  Date:     10/03/17  Time:    16:01              Narrative:    History: Cough    Normal heart size.  Prominent curvilinear opacity along the right superior mediastinum is in a location where previously there was markedly air distended esophagus on multiple prior studies, most recent a 3/4/15 chest x-ray.  This is suspicious for mass/superior mediastinal lymphadenopathy, but conceivably could be a markedly distended fluid filled esophagus.  Lung fields otherwise clear.                             CT Head Without Contrast (Final result)  Result time 10/03/17  15:06:15   Procedure changed from CT Head W Wo Contrast     Final result by Darlin Monae MD (10/03/17 15:06:15)                 Impression:      Advanced atrophy with white matter degeneration.  No acute findings.        All CT scans at this facility use dose modulation, iterative reconstruction and/or weight based dosing when appropriate to reduce radiation dose to as low as reasonably achievable.       Electronically signed by: DARLIN MONAE MD  Date:     10/03/17  Time:    15:06              Narrative:    CT HEAD WITHOUT CONTRAST     History:  stroke    Technique:  Noncontrast CT of the brain.     Comparison: 03/05/2012.    Findings:  The ventricles are dilated consistent with generalized atrophy. Associated age-related white matter degeneration is present.     No significant acute findings are noted.

## 2017-10-04 NOTE — ASSESSMENT & PLAN NOTE
-Family notes history, and is on thickened liquid at nursing home.   -NPO  -Swallow study, Speech eval and treat  -aspiration precautions  -GI consult

## 2017-10-04 NOTE — HOSPITAL COURSE
The pt with advanced Dementia was admitted with severe dysphagia. CT chest showed Severe/massive dilatation of the esophagus filled with food stuff. Pt is NPO for now. She has dementia. Spouse at bedside. Care discussed with GI who felt pt had sevee Achalasia. GI performed EGD which showed severely dilated and filled esophagus from UES to LES with old food. This was removed with lavage and suction. About 1L of old food was removed from the esophagus. The LES was tight but the scope was able to traverse this. Botox was injected into the LES. GI recommended liquid diet. Speech then performed a MBSS and recommended the same due to reduced laryngeal elevation and LES dysfunction. This was extensiveley explained to the pt's family. The pt ws also noted to have a UTI with urine culture showing 10,000 - 49,999 cfu/ml STAPHYLOCOCCUS AUREUS. Susceptibility pending. Will discharge pt on oral liquid Bactrim. Pt will need all medications crushed and dissolved in liquid. The pt was seen and examined today and determined to be stable for discharge.

## 2017-10-04 NOTE — PROGRESS NOTES
[facility] -   Adult Nutrition  Progress Note    SUMMARY     Recommendations    Recommendation/Intervention:   1. Advance diet as medically able to Puree or per SLP.   2. If unable to advance diet initiate continuous TF using Diabetisource. Initiate at 10 ml hr.  Increase by 10ml/hr until final goal rate of 50ml/hr is reached.   This will provide 1440 calories, 72 grams of protein, 982 mL of free water.   Water flushes per MD. Hold for N/V. Hold for residuals >400ml.   3. RD to f/u.  Goals: Pt to recieve nutrition within 48 hours.   Nutrition Goal Status: new    Reason for Assessment    Reason for Assessment: identified at risk by screening criteria (Dysphagia Risk)  Diagnosis:  (Encephalopathy)  Relevent Medical History: DM, HBP, Hep C, Hx of CHF         General Information Comments: Family at bedside. Reports pt currently lives in a nursing home and is on a puree diet at the facility. Family reports pt has had decreased appetite for the past month. Possible mass in esophagus. Scan to be performed today. Currently NPO. Failed RN bedside dysphagia screening.     Nutrition Discharge Planning: Too soon to determine.     Nutrition Prescription Ordered    Current Diet Order: NPO    Evaluation of Received Nutrients/Fluid Intake    % Intake of Estimated Energy Needs: 0 - 25 %  % Meal Intake: NPO     Nutrition Risk Screen     Nutrition Risk Screen: dysphagia or difficulty swallowing    Nutrition/Diet History    Patient Reported Diet/Restrictions/Preferences:  (Puree at NH)  Food Preferences: No cultural or Confucianist food preferences identified.  Factors Affecting Nutritional Intake: NPO, compromised airway, impaired cognitive status/motor control, decreased appetite, difficulty/impaired swallowing    Labs/Tests/Procedures/Meds    Pertinent Labs Reviewed: reviewed  Pertinent Labs Comments: GFR 48L, Glu 162L, Alb 2.9L  Pertinent Medications Reviewed: reviewed  Pertinent Medications Comments: Heparin,  "pantoprazole, metoprolol    Physical Findings    Overall Physical Appearance: nourished  Skin: rash    Anthropometrics    Temp: 97.4 °F (36.3 °C)    Height: 5' 4.02" (162.6 cm)  Weight Method: Bed Scale  Weight: 81 kg (178 lb 9.2 oz)  Ideal Body Weight (IBW), Female: 120.1 lb  % Ideal Body Weight, Female (lb): 148.68 lb  BMI (Calculated): 30.7  BMI Grade: 25 - 29.9 - overweight    Estimated/Assessed Needs    Weight Used For Calorie Calculations: 81 kg (178 lb 9.2 oz)   Energy Calorie Requirements (kcal): 1493 kcal/d  Energy Need Method: Luce-St Jeor (x 1.25)  RMR (Luce-St. Jeor Equation): 1195.25     Weight Used For Protein Calculations: 81 kg (178 lb 9.2 oz)  Protein Requirements: 65-81 gm/kg (0.8-1.0 gm/kg)    Fluid Need Method: RDA Method  RDA Method (mL): 1493    CHO Requirement: 187 gm/d     Assessment and Plan    Nutrition Problem  Inadequate energy intake    Related to (etiology):   dysphagia    Signs and Symptoms (as evidenced by):   NPO    Interventions/Recommendations (treatment strategy):  Diet upgrade or TF rec's    Nutrition Diagnosis Status:   New    Monitor and Evaluation    Food and Nutrient Intake: energy intake  Food and Nutrient Adminstration: diet order  Knowledge/Beliefs/Attitudes: food and nutrition knowledge/skill  Biochemical Data, Medical Tests and Procedures: gastrointestinal profile, glucose/endocrine profile  Nutrition-Focused Physical Findings: overall appearance    Nutrition Risk    Level of Risk:  (f/u 2x/weekly)    Nutrition Follow-Up    RD Follow-up?: Yes    "

## 2017-10-04 NOTE — ASSESSMENT & PLAN NOTE
Patient has chronic dementia and is a resident of a nursing facility   Family at bedside and provide history

## 2017-10-04 NOTE — PLAN OF CARE
Problem: Patient Care Overview  Goal: Plan of Care Review  Outcome: Ongoing (interventions implemented as appropriate)  Pt remains free from falls and injuries fall precautions maintained bed alarm on call light within reach. Fall remains at bedside. POC reviewed with family and pt family verbalized understanding and teach back,. Blood glucose monitoring maintained no supplemental insulin given. Pt turned and repositioned every hrs and as needed with wedge in place.pt remains NPO hob at 30 degrees. 12 hr chart check complete.

## 2017-10-04 NOTE — ASSESSMENT & PLAN NOTE
-neuro checks  -CT of head reviewed: neg for acute findings  -Treat UTI, use Meropenum ; given high risk of aspiration .  -repeat chest xray  -monitor for aspiration complications   -monitor for improvement with treatment   -consider additional evaluation if not improving.

## 2017-10-04 NOTE — PT/OT/SLP EVAL
Speech Language Pathology      Janeth Tamez  MRN: [id number]    ST initiated eval via a chart review and nursing interview.  Per chart pt has a dilated esophagus with food contained and nursing reported pt vomiting food when she is repositioned.  Pt's family not present at bedside and pt was getting changed by nursing staff.  Per GI notes the pt will be undergoing an EGD tomorrow.  ST will not assess pt's swallow until after EGD id completed and pt is appropriate for eval.       Enedelia Milner, MARJ-SLP

## 2017-10-04 NOTE — PLAN OF CARE
CM met with patient/family at the bedside to assess for discharge needs.  Rafael,  at bedside answered questions for patient.  Patient has history of dementia.  Patient lives at [facility] as a resident and Rafael checks on her twice daily.  Family states that the plan is a GI consult and possibly a scope to evaluate the lump that was found in ER.  Patient is wheelchair bound and is a total assist to be placed in wheelchair.  The course of treatment will be determined after patient is evaluated by GI.  RUSS provided a transitional care folder, information on advanced directives, information on pharmacy bedside delivery, and discharge planning begin on admission with contact information for CARLY Garvin    D/C plan: return to [facility]    CM handed off to CARLY Garvin     10/04/17 1010   Discharge Assessment   Assessment Type Discharge Planning Assessment   Confirmed/corrected address and phone number on facesheet? Yes   Assessment information obtained from? Caregiver;Medical Record   Expected Length of Stay (days) (TBD)   Communicated expected length of stay with patient/caregiver yes   Prior to hospitilization cognitive status: Unable to Assess   Prior to hospitalization functional status: Wheelchair Bound   Current cognitive status: Unable to Assess   Current Functional Status: Wheelchair Bound   Facility Arrived From: [facility] Resident   Lives With facility resident   Able to Return to Prior Arrangements yes   Is patient able to care for self after discharge? No   Who are your caregiver(s) and their phone number(s)? Rafael Tamez, spouse [phone]   Patient's perception of discharge disposition nursing home   Readmission Within The Last 30 Days no previous admission in last 30 days   Patient currently being followed by outpatient case management? No   Patient currently receives any other outside agency services? No   Equipment Currently Used at Home wheelchair   Do you have any problems affording any of your  prescribed medications? No   Is the patient taking medications as prescribed? yes   Does the patient have transportation home? Yes   Transportation Available other (see comments)  ([place] will provide transportation)   Dialysis Name and Scheduled days NA   Does the patient receive services at the [facility]? No   Discharge Plan A Return to nursing home   Patient/Family In Agreement With Plan yes

## 2017-10-04 NOTE — PLAN OF CARE
Problem: Patient Care Overview  Goal: Plan of Care Review  Rec's 10/4:   1. Advance diet as medically able to Puree or per SLP.   2. If unable to advance diet initiate continuous TF using Diabetisource. Initiate at 10 ml hr.  Increase by 10ml/hr until final goal rate of 50ml/hr is reached.   This will provide 1440 calories, 72 grams of protein, 982 mL of free water.   Water flushes per MD. Hold for N/V. Hold for residuals >400ml.   3. RD to f/u.  Goals: Pt to recieve nutrition within 48 hours.   Nutrition Goal Status: new

## 2017-10-04 NOTE — HPI
Patient is a 89 year old  female that presented to the ER yesterday for  AMS and dysphagia. She has a history of dementia and is resides in a nursing facility. She reportedly started choking yesterday when ingesting anything. This was also witnessed in the ER. She had a CTA chest done which showed severe/massive dilatation of the esophagus filled with food stuff. This is a chronic finding that has been demonstrated previously and is chronic in nature. Upon review of her chart, she had an esophagram done during previous admission in 2014 for similar complaints. This showed a dilated esophagus but there was no obstruction. With this information it was determined that it was likely related to end stage achalasia vs obstruction. G-tube was recommended by IR while patient was in the ER. Per notes, patient's  verbalizes consent. Today, the patient's  and daughter are at the bedside and are not in favor of alternative feeding through a gastric tube. Dr. Clark also at bedside and an extensive discussion was had about findings thus far and expected outcomes vs risks involved with procedures. Per diagnostic testing, it appears she has chronic changes that are mostly consistent with achalasia.  and daughter are insistent upon having EGD done for further evaluation despite discussion of high risk of complications including aspiration.

## 2017-10-04 NOTE — ASSESSMENT & PLAN NOTE
Patient is a 89 yr old with chronic dilation of the esophagus. Recent CTA showed severe/massive dilatation of the esophagus filled with food stuff.  This is consistent with achalasia vs obstruction  Recommended G-tube placement by IR due to risk of aspiration with peg tube placement.   Extensive discussion had between myself, Dr. Clark and the patient's  and daughter that included likely etiology and projected management plan with alternative nutrition source.    and daughter are insistent that something else can be done to fix the problem and evaluation with EGD  Discussed with family that she is high risk for aspiration and should be done with general anesthesia.  Will plan for EGD tomorrow.

## 2017-10-04 NOTE — PROVIDER PROGRESS NOTES - EMERGENCY DEPT.
8:32 Pm: Pt was initially seen, evaluated, and discharged by Dr. Valle. Upon discharge, pt began aspirating on the prescribed medications. Dr. Kim evaluated the patient. Dr. Kim discussed with the patient's  possible forms of treatment, including placement of a G Tube. Pt's  verbalizes consent at this time for G Tube placement.     8:38 PM: Dr. Kim discussed the pt's case with Kj Alvarez NP ([facility] medicine) who recommends consulting GI.    9:15 PM: Dr. Kim discussed the pt's case with Dr. Clark (GI) who states that pt's sxs are most likely related to achalasia or a disrupted esophagus, in which case a feeding tube will need to be placed by interventional radiology. Dr. Clark states that he will f/u with the pt in the AM.    9:25 PM: Discussed case with Kj Alvarez NP ([facility] Medicine). Dr. Fuller agrees with current care and management of pt and accepts admission.   Admitting Service: Hospital medicine   Admitting Physician: Dr. Fuller  Admit to: Med-tele    9:30 PM: Re-evaluated pt. I have discussed test results, shared treatment plan, and the need for admission with patient and family at bedside. Pt and family express understanding at this time and agree with all information. All questions answered. Pt and family have no further questions or concerns at this time. Pt is ready for admit.

## 2017-10-04 NOTE — H&P
"[facility] Hospital Medicine  History & Physical    Patient Name: Janeth Tamez  MRN: [id number]  Admission Date: 10/3/2017  Attending Physician: Arianna Fuller MD   Primary Care Provider: Primary Doctor No         Patient information was obtained from nursing home, past medical records and ER records.     Subjective:     Principal Problem:Encephalopathy    Chief Complaint:   Chief Complaint   Patient presents with    Cerebrovascular Accident     pt noticed by [facility] not acting nomal around 1130, [facility] was not told what normal was but this is not pt baseline        HPI: Janeth Tamez is a 89 y.o. female patient , with history of dementia, who presented to the Emergency Department for AMS which onset at 11:30 AM. Symptoms are constant and moderate in severity. Per [facility], pt is not at her baseline. Per pt's family, pt has had a stroke before and she began to act strangely today after, Pt was coughing up "slime", SOB, and in distress. The patient was sent from [facility] for further treatment. The patient was evaluated in the ER and found to have a UTI, was treated and being discharged, when the patient began to vomit after oral intake in the Er. The patient was re evaluated by Dr. Kim evaluated. Dr. Kim discussed with the patient's  possible forms of treatment, including placement of a G Tube. Pt's  verbalizes consent at this time for G Tube placement. Dr. Kim discussed the pt's case with Dr. Clark (GI) who states that pt's sxs are most likely related to achalasia or a disrupted esophagus, in which case a feeding tube will need to be placed by interventional radiology. Dr. Clark states that he will f/u with the pt in the AM..   No further complaints or concerns at this time. HPI limited due to pt AMS and dementia.    ADVANCE DIRECTIVES: The patient has advance directives, copy noted on chart from nursing home.     Past Medical History: "   Diagnosis Date    Arthritis     Degenerative joint disease     Dementia     Diabetes mellitus     HBP (high blood pressure)     Hepatitis C     History of congestive heart failure     History of frequent urinary tract infections     Peripheral neuropathy     Pulmonary embolism        Past Surgical History:   Procedure Laterality Date    2 abdominal surgeries      atopic pregnancy      CATARACT EXTRACTION W/  INTRAOCULAR LENS IMPLANT  OD 10/30/13    CATARACT EXTRACTION W/  INTRAOCULAR LENS IMPLANT  OS 10/22/14    GALLBLADDER SURGERY      IV filter for pulmonary embolism      TONSILLECTOMY, ADENOIDECTOMY         Review of patient's allergies indicates:   Allergen Reactions    Codeine     Penicillins     Prednisone     Unable to assess      Opiates       No current facility-administered medications on file prior to encounter.      Current Outpatient Prescriptions on File Prior to Encounter   Medication Sig    albuterol (ACCUNEB) 1.25 mg/3 mL Nebu Take 2.5 mg by nebulization every 6 (six) hours as needed.     amlodipine (NORVASC) 5 MG tablet Take 1 tablet (5 mg total) by mouth once daily.    ascorbic acid (VITAMIN C) 500 MG tablet Take 500 mg by mouth 2 (two) times daily.    ASPIRIN ORAL Take 81 mg by mouth once daily.     atorvastatin (LIPITOR) 20 MG tablet Take 1 tablet (20 mg total) by mouth every evening.    cyanocobalamin, vitamin B-12, 1,000 mcg TbSR Take by mouth once daily.    loratadine (CLARITIN) 10 mg tablet Take 10 mg by mouth once daily.    lorazepam (ATIVAN) 1 MG tablet 2 (two) times daily.     metoprolol tartrate (LOPRESSOR) 25 MG tablet     paroxetine (PAXIL) 20 MG tablet Take 10 mg by mouth every morning.     POLYETHYLENE GLYCOL 3350 (MIRALAX ORAL) Take by mouth.    PROTEIN SUPPLEMENT (PROMOD PROTEIN ORAL) Take by mouth.    senna (SENOKOT) 8.6 mg tablet Take 1 tablet by mouth once daily.    [DISCONTINUED] polyethylene glycol (GLYCOLAX) 17 gram/dose powder      hydrocodone-acetaminophen 5-325mg (NORCO) 5-325 mg per tablet Take 1 tablet by mouth every 4 (four) hours as needed for Pain.    [DISCONTINUED] olmesartan (BENICAR) 40 MG tablet Take 40 mg by mouth once daily.    [DISCONTINUED] olmesartan (BENICAR) 40 MG tablet      Family History     Problem Relation (Age of Onset)    Cancer Sister, Brother    Hypertension Mother, Sister        Social History Main Topics    Smoking status: Never Smoker    Smokeless tobacco: Never Used    Alcohol use No    Drug use: No    Sexual activity: Not on file     Review of Systems   Unable to perform ROS: Dementia     Objective:     Vital Signs (Most Recent):  Temp: 98.6 °F (37 °C) (10/03/17 2231)  Pulse: 79 (10/03/17 2231)  Resp: 16 (10/03/17 2231)  BP: (!) 177/78 (10/03/17 2231)  SpO2: 95 % (10/03/17 2231) Vital Signs (24h Range):  Temp:  [97.8 °F (36.6 °C)-98.8 °F (37.1 °C)] 98.6 °F (37 °C)  Pulse:  [61-81] 79  Resp:  [16-20] 16  SpO2:  [95 %-98 %] 95 %  BP: (134-177)/(61-78) 177/78     Weight: 81 kg (178 lb 9.2 oz)  Body mass index is 30.65 kg/m².    Physical Exam   Constitutional: No distress.   Frail, thin, demented elderly female    HENT:   Head: Normocephalic and atraumatic.   Nose: Nose normal.   Eyes: Conjunctivae and EOM are normal. Pupils are equal, round, and reactive to light. No scleral icterus.   Neck: Normal range of motion. Neck supple. No tracheal deviation present.   Cardiovascular: Normal rate, regular rhythm, normal heart sounds and intact distal pulses.    No murmur heard.  Pulmonary/Chest: Effort normal. No stridor. No respiratory distress. She has no wheezes. She has rales ( /ronchi bilaterally).   Abdominal: Soft. Bowel sounds are normal. She exhibits no distension. There is no tenderness. There is no guarding.   Genitourinary:   Genitourinary Comments: brief     Musculoskeletal: Normal range of motion. She exhibits no edema or deformity.   Generalized weakness     Neurological: She is alert. No cranial nerve  deficit.   Confused, Oriented to self only  incomprehensible speech only  Able to follow some simple commands.     Skin: Skin is warm and dry. Capillary refill takes 2 to 3 seconds. No rash noted. She is not diaphoretic.   Psychiatric:   Unable to assess      Nursing note and vitals reviewed.       Significant Labs: All pertinent labs within the past 24 hours have been reviewed.   Results for orders placed or performed during the hospital encounter of 10/03/17   CBC auto differential   Result Value Ref Range    WBC 11.01 3.90 - 12.70 K/uL    RBC 4.73 4.00 - 5.40 M/uL    Hemoglobin 13.0 12.0 - 16.0 g/dL    Hematocrit 39.9 37.0 - 48.5 %    MCV 84 82 - 98 fL    MCH 27.5 27.0 - 31.0 pg    MCHC 32.6 32.0 - 36.0 g/dL    RDW 14.9 (H) 11.5 - 14.5 %    Platelets 232 150 - 350 K/uL    MPV 10.8 9.2 - 12.9 fL    Gran # 8.4 (H) 1.8 - 7.7 K/uL    Lymph # 1.7 1.0 - 4.8 K/uL    Mono # 0.6 0.3 - 1.0 K/uL    Eos # 0.3 0.0 - 0.5 K/uL    Baso # 0.02 0.00 - 0.20 K/uL    Gran% 76.2 (H) 38.0 - 73.0 %    Lymph% 15.8 (L) 18.0 - 48.0 %    Mono% 5.3 4.0 - 15.0 %    Eosinophil% 2.5 0.0 - 8.0 %    Basophil% 0.2 0.0 - 1.9 %    Differential Method Automated    Comprehensive metabolic panel   Result Value Ref Range    Sodium 137 136 - 145 mmol/L    Potassium 4.4 3.5 - 5.1 mmol/L    Chloride 104 95 - 110 mmol/L    CO2 23 23 - 29 mmol/L    Glucose 128 (H) 70 - 110 mg/dL    BUN, Bld 14 10 - 30 mg/dL    Creatinine 1.1 0.5 - 1.4 mg/dL    Calcium 8.7 8.7 - 10.5 mg/dL    Total Protein 6.7 6.0 - 8.4 g/dL    Albumin 2.8 (L) 3.5 - 5.2 g/dL    Total Bilirubin 0.4 0.1 - 1.0 mg/dL    Alkaline Phosphatase 91 55 - 135 U/L    AST 13 10 - 40 U/L    ALT 10 10 - 44 U/L    Anion Gap 10 8 - 16 mmol/L    eGFR if African American 50 (A) >60 mL/min/1.73 m^2    eGFR if non African American 43 (A) >60 mL/min/1.73 m^2   Urinalysis   Result Value Ref Range    Specimen UA Urine, Catheterized     Color, UA Yellow Yellow, Straw, Jigna    Appearance, UA Hazy (A) Clear    pH, UA  6.0 5.0 - 8.0    Specific Gravity, UA 1.025 1.005 - 1.030    Protein, UA Trace (A) Negative    Glucose, UA Negative Negative    Ketones, UA Negative Negative    Bilirubin (UA) Negative Negative    Occult Blood UA 2+ (A) Negative    Nitrite, UA Negative Negative    Urobilinogen, UA Negative <2.0 EU/dL    Leukocytes, UA 2+ (A) Negative   Brain natriuretic peptide   Result Value Ref Range     (H) 0 - 99 pg/mL   CK   Result Value Ref Range    CPK 21 20 - 180 U/L   Troponin I   Result Value Ref Range    Troponin I <0.006 0.000 - 0.026 ng/mL   Urinalysis Microscopic   Result Value Ref Range    RBC, UA 5 (H) 0 - 4 /hpf    WBC, UA >100 (H) 0 - 5 /hpf    Bacteria, UA Occasional None-Occ /hpf    Microscopic Comment SEE COMMENT          Significant Imaging: I have reviewed all pertinent imaging results/findings within the past 24 hours.   Imaging Results          CTA Chest Non-Coronary (Final result)  Result time 10/03/17 18:37:25    Final result by Kj Bae III, MD (10/03/17 18:37:25)                 Impression:        1. Severe/massive dilatation of the esophagus filled with food stuff. This is a chronic finding.    2. No gross evidence of acute pulmonary emboli. There is moderately pronounced atheromatous change along the aorta without gross evidence of aneurysm formation.    3. Otherwise as above.      Electronically signed by: KJ BAE MD  Date:     10/03/17  Time:    18:37              Narrative:    CT angiogram of the chest.    Clinical indication: Shortness of breath.    Standard and MIPS/3-D images submitted.    There is severe/massive dilatation of the esophagus filled with food stuff. This is a chronic finding that has been demonstrated previously.    There is rather pronounced atheromatous change was scattered plaque formation along the aorta. No evidence of aneurysm formation or terri dissection.    There is opacification of the pulmonary arterial system without gross dilatation. There  no definite filling defects within the major branches to suggest emboli.    The lungs show chronic changes bilaterally. Cannot exclude mild basilar congestion. Mild scarring. Cardiomegaly noted.    Within the upper portion of the abdomen, there are multiple low attenuation foci in the liver, incompletely seen and severely streak artifact but probably cysts. No acute bony abnormality is suggested.                             X-Ray Chest AP Portable (Final result)  Result time 10/03/17 16:01:31    Final result by Diego Hugo MD (10/03/17 16:01:31)                 Impression:     Superior mediastinal mass as above.      Electronically signed by: DIEGO HUGO MD  Date:     10/03/17  Time:    16:01              Narrative:    History: Cough    Normal heart size.  Prominent curvilinear opacity along the right superior mediastinum is in a location where previously there was markedly air distended esophagus on multiple prior studies, most recent a 3/4/15 chest x-ray.  This is suspicious for mass/superior mediastinal lymphadenopathy, but conceivably could be a markedly distended fluid filled esophagus.  Lung fields otherwise clear.                             CT Head Without Contrast (Final result)  Result time 10/03/17 15:06:15   Procedure changed from CT Head W Wo Contrast     Final result by Darlin Monae MD (10/03/17 15:06:15)                 Impression:      Advanced atrophy with white matter degeneration.  No acute findings.        All CT scans at this facility use dose modulation, iterative reconstruction and/or weight based dosing when appropriate to reduce radiation dose to as low as reasonably achievable.       Electronically signed by: DARLIN MONAE MD  Date:     10/03/17  Time:    15:06              Narrative:    CT HEAD WITHOUT CONTRAST     History:  stroke    Technique:  Noncontrast CT of the brain.     Comparison: 03/05/2012.    Findings:  The ventricles are dilated consistent with  generalized atrophy. Associated age-related white matter degeneration is present.     No significant acute findings are noted.                                Assessment/Plan:     * Encephalopathy, due to UTI and risk for Aspiration     -neuro checks  -CT of head reviewed: neg for acute findings  -Treat UTI, use Meropenum ; given high risk of aspiration .  -repeat chest xray  -monitor for aspiration complications   -monitor for improvement with treatment   -consider additional evaluation if not improving.         Esophageal obstruction    -NPO  -CTA Chest reviewed: 1. Severe/massive dilatation of the esophagus filled with food stuff. This is a chronic finding. 2. No gross evidence of acute pulmonary emboli. There is moderately pronounced atheromatous change along the aorta without gross evidence of aneurysm formation. 3. Otherwise as above.  -Case discussed with GI, to see and possible need for IR   -recheck chest xray  -monitor for complications of aspiration .        Dysphagia    -Family notes history, and is on thickened liquid at nursing home.   -NPO  -Swallow study, Speech eval and treat  -aspiration precautions  -GI consult           HTN (hypertension)    -Hold PO meds  -Treat with IV   -PRN as well           Dementia    Monitor  Treat behavior as need          DM (diabetes mellitus)    A1C  IV fluids  accu check  NPO  monitor          VTE Risk Mitigation         Ordered     heparin (porcine) injection 5,000 Units  Every 8 hours     Route:  Subcutaneous        10/03/17 2247     Medium Risk of VTE  Once      10/03/17 2247     Reason for No Pharmacological VTE Prophylaxis  Once      10/03/17 2206     Reason for no Mechanical VTE Prophylaxis  Once      10/03/17 2206             Kj Alvarez NP  Department of Hospital Medicine   [facility]

## 2017-10-04 NOTE — ASSESSMENT & PLAN NOTE
-NPO  -CTA Chest reviewed: 1. Severe/massive dilatation of the esophagus filled with food stuff. This is a chronic finding. 2. No gross evidence of acute pulmonary emboli. There is moderately pronounced atheromatous change along the aorta without gross evidence of aneurysm formation. 3. Otherwise as above.  -Case discussed with GI, to see and possible need for IR   -recheck chest xray  -monitor for complications of aspiration .

## 2017-10-04 NOTE — CONSULTS
[facility]   Gastroenterology  Consult Note    Patient Name: Janeth Tamez  MRN: [id number]  Admission Date: 10/3/2017  Hospital Length of Stay: 1 days  Code Status: DNR   Attending Provider: Jenelle Harris MD   Consulting Provider: Vangie Juarez NP  Primary Care Physician: Primary Doctor No  Principal Problem:Dysphagia    Inpatient consult to Gastroenterology  Consult performed by: VANGIE JUAREZ  Consult ordered by: ADALID JIMENEZ        Subjective:     HPI:  Patient is a 89 year old  female that presented to the ER yesterday for  AMS and dysphagia. She has a history of dementia and is resides in a nursing facility. She reportedly started choking yesterday when ingesting anything. This was also witnessed in the ER. She had a CTA chest done which showed severe/massive dilatation of the esophagus filled with food stuff. This is a chronic finding that has been demonstrated previously and is chronic in nature. Upon review of her chart, she had an esophagram done during previous admission in 2014 for similar complaints. This showed a dilated esophagus but there was no obstruction. With this information it was determined that it was likely related to end stage achalasia vs obstruction. G-tube was recommended by IR while patient was in the ER. Per notes, patient's  verbalizes consent. Today, the patient's  and daughter are at the bedside and are not in favor of alternative feeding through a gastric tube. Dr. Clark also at bedside and an extensive discussion was had about findings thus far and expected outcomes vs risks involved with procedures. Per diagnostic testing, it appears she has chronic changes that are mostly consistent with achalasia.  and daughter are insistent upon having EGD done for further evaluation despite discussion of high risk of complications including aspiration.     Past Medical History:   Diagnosis Date    Arthritis     Degenerative joint  disease     Dementia     Diabetes mellitus     HBP (high blood pressure)     Hepatitis C     History of congestive heart failure     History of frequent urinary tract infections     Peripheral neuropathy     Pulmonary embolism        Past Surgical History:   Procedure Laterality Date    2 abdominal surgeries      atopic pregnancy      CATARACT EXTRACTION W/  INTRAOCULAR LENS IMPLANT  OD 10/30/13    CATARACT EXTRACTION W/  INTRAOCULAR LENS IMPLANT  OS 10/22/14    GALLBLADDER SURGERY      IV filter for pulmonary embolism      TONSILLECTOMY, ADENOIDECTOMY         Review of patient's allergies indicates:   Allergen Reactions    Codeine     Penicillins     Prednisone     Unable to assess      Opiates     Family History     Problem Relation (Age of Onset)    Cancer Sister, Brother    Hypertension Mother, Sister        Social History Main Topics    Smoking status: Never Smoker    Smokeless tobacco: Never Used    Alcohol use No    Drug use: No    Sexual activity: Not on file     Review of Systems   Unable to perform ROS: Dementia     Objective:     Vital Signs (Most Recent):  Temp: 98.4 °F (36.9 °C) (10/04/17 1233)  Pulse: 83 (10/04/17 1233)  Resp: 18 (10/04/17 1233)  BP: (!) 180/79 (10/04/17 1233)  SpO2: 97 % (10/04/17 1233) Vital Signs (24h Range):  Temp:  [97.4 °F (36.3 °C)-98.8 °F (37.1 °C)] 98.4 °F (36.9 °C)  Pulse:  [61-83] 83  Resp:  [16-20] 18  SpO2:  [94 %-98 %] 97 %  BP: (134-183)/(61-84) 180/79     Weight: 81 kg (178 lb 9.2 oz) (10/04/17 1047)  Body mass index is 30.64 kg/m².      Intake/Output Summary (Last 24 hours) at 10/04/17 1446  Last data filed at 10/04/17 1200   Gross per 24 hour   Intake            572.5 ml   Output                0 ml   Net            572.5 ml       Lines/Drains/Airways     Peripheral Intravenous Line                 Peripheral IV - Single Lumen 10/03/17 1449 Left Forearm less than 1 day                Physical Exam   Constitutional: She appears well-developed.  No distress.   HENT:   Head: Normocephalic.   Eyes: Pupils are equal, round, and reactive to light.   Cardiovascular: Normal rate and regular rhythm.  Exam reveals no friction rub.    No murmur heard.  Pulmonary/Chest: Effort normal and breath sounds normal. No respiratory distress. She has no wheezes.   Abdominal: Soft. Bowel sounds are normal. She exhibits no distension. There is no tenderness.   Neurological: She is alert.   confused       Significant Labs:  CBC:   Recent Labs  Lab 10/03/17  1541 10/04/17  0518   WBC 11.01 12.49  11.99   HGB 13.0 13.1  13.1   HCT 39.9 40.4  40.3    255  236     CMP:   Recent Labs  Lab 10/04/17  0518   *   CALCIUM 9.3   ALBUMIN 2.9*   PROT 7.0      K 4.0   CO2 28      BUN 13   CREATININE 1.0   ALKPHOS 96   ALT 10   AST 12   BILITOT 0.4       Significant Imaging:  Imaging results within the past 24 hours have been reviewed.    Assessment/Plan:     * Dysphagia    Patient is a 89 yr old with chronic dilation of the esophagus. Recent CTA showed severe/massive dilatation of the esophagus filled with food stuff.  This is consistent with achalasia vs obstruction  Recommended G-tube placement by IR due to risk of aspiration with peg tube placement.   Extensive discussion had between myself, Dr. Clark and the patient's  and daughter that included likely etiology and projected management plan with alternative nutrition source.    and daughter are insistent that something else can be done to fix the problem and evaluation with EGD  Discussed with family that she is high risk for aspiration and should be done with general anesthesia.  Will plan for EGD tomorrow.         Dementia    Patient has chronic dementia and is a resident of a nursing facility   Family at bedside and provide history             Thank you for your consult. I will follow-up with patient. Please contact us if you have any additional questions.    Vangie Iqbal,  NP  Gastroenterology  [facility]

## 2017-10-05 ENCOUNTER — ANESTHESIA EVENT (OUTPATIENT)
Dept: ENDOSCOPY | Facility: HOSPITAL | Age: 82
DRG: 391 | End: 2017-10-05
Payer: MEDICARE

## 2017-10-05 ENCOUNTER — SURGERY (OUTPATIENT)
Age: 82
End: 2017-10-05

## 2017-10-05 ENCOUNTER — ANESTHESIA (OUTPATIENT)
Dept: ENDOSCOPY | Facility: HOSPITAL | Age: 82
DRG: 391 | End: 2017-10-05
Payer: MEDICARE

## 2017-10-05 VITALS — RESPIRATION RATE: 20 BRPM

## 2017-10-05 PROBLEM — R06.02 SOB (SHORTNESS OF BREATH): Status: ACTIVE | Noted: 2017-10-05

## 2017-10-05 PROBLEM — K22.0 ACHALASIA: Status: ACTIVE | Noted: 2017-10-03

## 2017-10-05 PROBLEM — R13.19 ESOPHAGEAL DYSPHAGIA: Status: ACTIVE | Noted: 2017-10-03

## 2017-10-05 LAB
ALBUMIN SERPL BCP-MCNC: 2.7 G/DL
ALP SERPL-CCNC: 93 U/L
ALT SERPL W/O P-5'-P-CCNC: 11 U/L
ANION GAP SERPL CALC-SCNC: 7 MMOL/L
AST SERPL-CCNC: 14 U/L
BASOPHILS # BLD AUTO: 0.01 K/UL
BASOPHILS NFR BLD: 0.1 %
BILIRUB SERPL-MCNC: 0.6 MG/DL
BUN SERPL-MCNC: 8 MG/DL
CALCIUM SERPL-MCNC: 9.3 MG/DL
CHLORIDE SERPL-SCNC: 100 MMOL/L
CO2 SERPL-SCNC: 28 MMOL/L
CREAT SERPL-MCNC: 0.9 MG/DL
DIFFERENTIAL METHOD: ABNORMAL
EOSINOPHIL # BLD AUTO: 0.2 K/UL
EOSINOPHIL NFR BLD: 2.3 %
ERYTHROCYTE [DISTWIDTH] IN BLOOD BY AUTOMATED COUNT: 15 %
EST. GFR  (AFRICAN AMERICAN): >60 ML/MIN/1.73 M^2
EST. GFR  (NON AFRICAN AMERICAN): 55 ML/MIN/1.73 M^2
GLUCOSE SERPL-MCNC: 164 MG/DL
HCT VFR BLD AUTO: 40.6 %
HGB BLD-MCNC: 13.2 G/DL
LYMPHOCYTES # BLD AUTO: 1.9 K/UL
LYMPHOCYTES NFR BLD: 18.2 %
MCH RBC QN AUTO: 27.4 PG
MCHC RBC AUTO-ENTMCNC: 32.5 G/DL
MCV RBC AUTO: 84 FL
MONOCYTES # BLD AUTO: 0.8 K/UL
MONOCYTES NFR BLD: 7.6 %
NEUTROPHILS # BLD AUTO: 7.5 K/UL
NEUTROPHILS NFR BLD: 71.8 %
PLATELET # BLD AUTO: 218 K/UL
PMV BLD AUTO: 10.8 FL
POCT GLUCOSE: 138 MG/DL (ref 70–110)
POCT GLUCOSE: 148 MG/DL (ref 70–110)
POCT GLUCOSE: 150 MG/DL (ref 70–110)
POCT GLUCOSE: 155 MG/DL (ref 70–110)
POCT GLUCOSE: 157 MG/DL (ref 70–110)
POCT GLUCOSE: 163 MG/DL (ref 70–110)
POCT GLUCOSE: 171 MG/DL (ref 70–110)
POTASSIUM SERPL-SCNC: 3.5 MMOL/L
PROT SERPL-MCNC: 6.8 G/DL
RBC # BLD AUTO: 4.82 M/UL
SODIUM SERPL-SCNC: 135 MMOL/L
WBC # BLD AUTO: 10.45 K/UL

## 2017-10-05 PROCEDURE — 37000008 HC ANESTHESIA 1ST 15 MINUTES: Performed by: INTERNAL MEDICINE

## 2017-10-05 PROCEDURE — 63600175 PHARM REV CODE 636 W HCPCS: Performed by: INTERNAL MEDICINE

## 2017-10-05 PROCEDURE — 25000003 PHARM REV CODE 250: Performed by: NURSE ANESTHETIST, CERTIFIED REGISTERED

## 2017-10-05 PROCEDURE — 36415 COLL VENOUS BLD VENIPUNCTURE: CPT

## 2017-10-05 PROCEDURE — 25000003 PHARM REV CODE 250: Performed by: INTERNAL MEDICINE

## 2017-10-05 PROCEDURE — 25000003 PHARM REV CODE 250: Performed by: NURSE PRACTITIONER

## 2017-10-05 PROCEDURE — 80053 COMPREHEN METABOLIC PANEL: CPT

## 2017-10-05 PROCEDURE — 63600175 PHARM REV CODE 636 W HCPCS: Performed by: NURSE ANESTHETIST, CERTIFIED REGISTERED

## 2017-10-05 PROCEDURE — 43236 UPPR GI SCOPE W/SUBMUC INJ: CPT | Mod: ,,, | Performed by: INTERNAL MEDICINE

## 2017-10-05 PROCEDURE — 21400001 HC TELEMETRY ROOM

## 2017-10-05 PROCEDURE — 43236 UPPR GI SCOPE W/SUBMUC INJ: CPT | Performed by: INTERNAL MEDICINE

## 2017-10-05 PROCEDURE — 99232 SBSQ HOSP IP/OBS MODERATE 35: CPT | Mod: 25,,, | Performed by: INTERNAL MEDICINE

## 2017-10-05 PROCEDURE — 37000009 HC ANESTHESIA EA ADD 15 MINS: Performed by: INTERNAL MEDICINE

## 2017-10-05 PROCEDURE — 63600175 PHARM REV CODE 636 W HCPCS: Performed by: NURSE PRACTITIONER

## 2017-10-05 PROCEDURE — 0DC58ZZ EXTIRPATION OF MATTER FROM ESOPHAGUS, VIA NATURAL OR ARTIFICIAL OPENING ENDOSCOPIC: ICD-10-PCS | Performed by: INTERNAL MEDICINE

## 2017-10-05 PROCEDURE — 3E0G8GC INTRODUCTION OF OTHER THERAPEUTIC SUBSTANCE INTO UPPER GI, VIA NATURAL OR ARTIFICIAL OPENING ENDOSCOPIC: ICD-10-PCS | Performed by: INTERNAL MEDICINE

## 2017-10-05 PROCEDURE — C9113 INJ PANTOPRAZOLE SODIUM, VIA: HCPCS | Performed by: NURSE PRACTITIONER

## 2017-10-05 PROCEDURE — 96372 THER/PROPH/DIAG INJ SC/IM: CPT

## 2017-10-05 PROCEDURE — 85025 COMPLETE CBC W/AUTO DIFF WBC: CPT

## 2017-10-05 RX ORDER — SODIUM CHLORIDE, SODIUM LACTATE, POTASSIUM CHLORIDE, CALCIUM CHLORIDE 600; 310; 30; 20 MG/100ML; MG/100ML; MG/100ML; MG/100ML
INJECTION, SOLUTION INTRAVENOUS CONTINUOUS
Status: DISCONTINUED | OUTPATIENT
Start: 2017-10-05 | End: 2017-10-05

## 2017-10-05 RX ORDER — SODIUM CHLORIDE, SODIUM LACTATE, POTASSIUM CHLORIDE, CALCIUM CHLORIDE 600; 310; 30; 20 MG/100ML; MG/100ML; MG/100ML; MG/100ML
INJECTION, SOLUTION INTRAVENOUS CONTINUOUS PRN
Status: DISCONTINUED | OUTPATIENT
Start: 2017-10-05 | End: 2017-10-05

## 2017-10-05 RX ORDER — SUCCINYLCHOLINE CHLORIDE 20 MG/ML
INJECTION INTRAMUSCULAR; INTRAVENOUS
Status: DISCONTINUED | OUTPATIENT
Start: 2017-10-05 | End: 2017-10-05

## 2017-10-05 RX ORDER — LIDOCAINE HCL/PF 100 MG/5ML
SYRINGE (ML) INTRAVENOUS
Status: DISCONTINUED | OUTPATIENT
Start: 2017-10-05 | End: 2017-10-05

## 2017-10-05 RX ORDER — PROPOFOL 10 MG/ML
VIAL (ML) INTRAVENOUS
Status: DISCONTINUED | OUTPATIENT
Start: 2017-10-05 | End: 2017-10-05

## 2017-10-05 RX ADMIN — PROPOFOL 30 MG: 10 INJECTION, EMULSION INTRAVENOUS at 02:10

## 2017-10-05 RX ADMIN — PANTOPRAZOLE SODIUM 40 MG: 40 INJECTION, POWDER, FOR SOLUTION INTRAVENOUS at 09:10

## 2017-10-05 RX ADMIN — CIPROFLOXACIN 400 MG: 2 INJECTION, SOLUTION INTRAVENOUS at 02:10

## 2017-10-05 RX ADMIN — HEPARIN SODIUM 5000 UNITS: 5000 INJECTION, SOLUTION INTRAVENOUS; SUBCUTANEOUS at 09:10

## 2017-10-05 RX ADMIN — SODIUM CHLORIDE, SODIUM LACTATE, POTASSIUM CHLORIDE, AND CALCIUM CHLORIDE: .6; .31; .03; .02 INJECTION, SOLUTION INTRAVENOUS at 01:10

## 2017-10-05 RX ADMIN — HEPARIN SODIUM 5000 UNITS: 5000 INJECTION, SOLUTION INTRAVENOUS; SUBCUTANEOUS at 06:10

## 2017-10-05 RX ADMIN — CIPROFLOXACIN 400 MG: 2 INJECTION, SOLUTION INTRAVENOUS at 04:10

## 2017-10-05 RX ADMIN — PROPOFOL 50 MG: 10 INJECTION, EMULSION INTRAVENOUS at 01:10

## 2017-10-05 RX ADMIN — SUCCINYLCHOLINE CHLORIDE 100 MG: 20 INJECTION, SOLUTION INTRAMUSCULAR; INTRAVENOUS at 01:10

## 2017-10-05 RX ADMIN — METOPROLOL TARTRATE 2.5 MG: 5 INJECTION INTRAVENOUS at 09:10

## 2017-10-05 RX ADMIN — ONABOTULINUMTOXINA 100 UNITS: 100 INJECTION, POWDER, LYOPHILIZED, FOR SOLUTION INTRADERMAL; INTRAMUSCULAR at 02:10

## 2017-10-05 RX ADMIN — LIDOCAINE HYDROCHLORIDE 100 MG: 20 INJECTION, SOLUTION INTRAVENOUS at 01:10

## 2017-10-05 RX ADMIN — SODIUM CHLORIDE, SODIUM LACTATE, POTASSIUM CHLORIDE, AND CALCIUM CHLORIDE: 600; 310; 30; 20 INJECTION, SOLUTION INTRAVENOUS at 01:10

## 2017-10-05 RX ADMIN — SODIUM CHLORIDE: 0.9 INJECTION, SOLUTION INTRAVENOUS at 06:10

## 2017-10-05 NOTE — TRANSFER OF CARE
"Anesthesia Transfer of Care Note    Patient: Janeth Tamez    Procedure(s) Performed: Procedure(s) (LRB):  ESOPHAGOGASTRODUODENOSCOPY (EGD) (N/A)    Patient location: PACU    Anesthesia Type: general    Transport from OR: Transported from OR on 2-3 L/min O2 by NC with adequate spontaneous ventilation    Post pain: adequate analgesia    Post assessment: no apparent anesthetic complications and tolerated procedure well    Post vital signs: stable    Level of consciousness: awake and responds to stimulation    Nausea/Vomiting: no nausea/vomiting    Complications: none    Transfer of care protocol was followed      Last vitals:   Visit Vitals  BP (!) 160/77 (BP Location: Left arm, Patient Position: Lying)   Pulse (!) 111   Temp 36.9 °C (98.4 °F) (Axillary)   Resp 20   Ht 5' 4" (1.626 m)   Wt 81 kg (178 lb 9.2 oz)   LMP  (LMP Unknown)   SpO2 96%   Breastfeeding? No   BMI 30.65 kg/m²     "

## 2017-10-05 NOTE — ASSESSMENT & PLAN NOTE
"I had a very long discussion with the patient's  and later with her daughter. We discussed that the patient appears to have achalasia and that this has likely been going on for many years. 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 We discussed the fact that she also has terminal dementia and likely has a short life expectancy. They both have a good understanding of this and have a clear expectation for comfort measures for the patient and have no expectations of extending her life with aggressive therapy. We discussed several options. We reviewed the role for PEG tube placement. They are not interested in this as they are concerned that she might pull out the PEG tube. I also discussed that PEG tube placement does not improve quality of life or extend life in patients with end stage dementia. We discussed that the role of the PEG tube would be to bypass the esophagus and allow for nutrition to be given into the stomach and hopefully avoid backup of fluid in the esophagus and reduce the risk of aspiration. They understand this and accept that aspiration and pneumonia are risks that they are willing to accept. We discussed performing an EGD to remove any residual food from the esophagus and to attempt to inject the LES with Botox in an effort to provide some improvement. They understand that even if this is successful that she would not be able to eat a regular diet and I would only recommend that she be on a liquid diet for the remainder of her life. They understand this and agree. We also discussed the fact that she appears to have a large amount of food in the esophagus based on imaging. I recommend that the patient be intubated for the procedure in order to protect her airway and minimize the risk of aspiration " during the procedure. We also discussed the possibility that she may not be able to be successfully intubated after the procedure. They understand this and would like to proceed. We also discussed her DNR status and the issues associated with this during her procedure. They would like to reverse the DNR for the procedure.     In summary, all risks and benefits have been discussed with the patient's  and daughter and we are in agreement that the plan will be as follows:  - EGD with removal of food from the esophagus today.  - Botox injection into LES during EGD  - Start a liquid diet after the procedure with no plans to advance. Risk of aspiration and pneumonia leading to death was also discussed with them both and they understand this and this is an acceptable risk to them.

## 2017-10-05 NOTE — PROGRESS NOTES
Discussed patient requested transfer to [facility] for GI evaluation by Dr. Ulrich with Dr. Milan with [facility] medicine. After reviewing the case in care everywhere and with Dr. Ulrich, Dr. Milan declined the transfer to [facility].

## 2017-10-05 NOTE — PLAN OF CARE
Pt lying in bed sleeping between care. No signs/symptoms of pain or distress. Family updated per MD after procedure.

## 2017-10-05 NOTE — SUBJECTIVE & OBJECTIVE
Review of Systems   Unable to perform ROS: Dementia     Objective:     Vital Signs (Most Recent):  Temp: 98.8 °F (37.1 °C) (10/05/17 0758)  Pulse: 108 (10/05/17 0900)  Resp: 17 (10/05/17 0758)  BP: (!) 184/79 (10/05/17 0758)  SpO2: 98 % (10/05/17 0758) Vital Signs (24h Range):  Temp:  [98.4 °F (36.9 °C)-98.8 °F (37.1 °C)] 98.8 °F (37.1 °C)  Pulse:  [] 108  Resp:  [16-18] 17  SpO2:  [95 %-98 %] 98 %  BP: (162-184)/(72-79) 184/79     Weight: 81 kg (178 lb 9.2 oz)  Body mass index is 30.64 kg/m².    Intake/Output Summary (Last 24 hours) at 10/05/17 1210  Last data filed at 10/05/17 0455   Gross per 24 hour   Intake          2118.75 ml   Output                0 ml   Net          2118.75 ml      Physical Exam   Constitutional: She appears well-developed and well-nourished. No distress.   HENT:   Head: Normocephalic and atraumatic.   Nose: Nose normal.   Mouth/Throat: Oropharynx is clear and moist.   Eyes: Conjunctivae are normal. No scleral icterus.   Neck: Neck supple. No tracheal deviation present.   Cardiovascular: Normal rate, regular rhythm, normal heart sounds and intact distal pulses.  Exam reveals no gallop and no friction rub.    No murmur heard.  Pulmonary/Chest: Effort normal and breath sounds normal. No stridor. No respiratory distress. She has no wheezes. She has no rales. She exhibits no tenderness.   Upper airway congestion    Abdominal: Soft. Bowel sounds are normal. She exhibits no distension and no mass. There is no tenderness. There is no rebound and no guarding.   Musculoskeletal: She exhibits no edema, tenderness or deformity.   Neurological: She is alert. No cranial nerve deficit. She exhibits normal muscle tone. Coordination normal.   Oriented to self only    Skin: Skin is warm and dry. No rash noted. She is not diaphoretic. No erythema. No pallor.   Psychiatric: She has a normal mood and affect. Her behavior is normal.   Nursing note and vitals reviewed.      Significant Labs:   BMP:    Recent Labs  Lab 10/03/17  1541  10/05/17  0438   *  < > 164*     < > 135*   K 4.4  < > 3.5     < > 100   CO2 23  < > 28   BUN 14  < > 8*   CREATININE 1.1  < > 0.9   CALCIUM 8.7  < > 9.3   MG 1.8  --   --    < > = values in this interval not displayed.  CBC:     Recent Labs  Lab 10/03/17  1541 10/04/17  0518 10/05/17  0438   WBC 11.01 12.49  11.99 10.45   HGB 13.0 13.1  13.1 13.2   HCT 39.9 40.4  40.3 40.6    255  236 218     CMP:     Recent Labs  Lab 10/04/17  0517 10/04/17  0518 10/05/17  0438    138 135*   K 3.9 4.0 3.5    101 100   CO2 29 28 28   * 162* 164*   BUN 13 13 8*   CREATININE 1.0 1.0 0.9   CALCIUM 9.3 9.3 9.3   PROT 7.0 7.0 6.8   ALBUMIN 2.9* 2.9* 2.7*   BILITOT 0.4 0.4 0.6   ALKPHOS 96 96 93   AST 12 12 14   ALT 10 10 11   ANIONGAP 7* 9 7*   EGFRNONAA 48* 48* 55*     All pertinent labs within the past 24 hours have been reviewed.    Significant Imaging:   Imaging Results          X-Ray Chest 1 View (Final result)  Result time 10/04/17 08:28:01   Procedure changed from Chest X-ray, PA And Lateral     Final result by Kj Rizvi MD (10/04/17 08:28:01)                 Impression:     No adverse interval change      Electronically signed by: KJ RIZVI MD  Date:     10/04/17  Time:    08:28              Narrative:    History: Aspiration risk    Comparison: 10/3/17    Result: Single view of the chest.       Right paratracheal stripe thickening which correlates with large amount of material within the esophagus on CT exam. In comparison to the prior study, there is no adverse interval changes.                             CTA Chest Non-Coronary (Final result)  Result time 10/03/17 18:37:25    Final result by Kj Bae III, MD (10/03/17 18:37:25)                 Impression:        1. Severe/massive dilatation of the esophagus filled with food stuff. This is a chronic finding.    2. No gross evidence of acute pulmonary emboli. There is  moderately pronounced atheromatous change along the aorta without gross evidence of aneurysm formation.    3. Otherwise as above.      Electronically signed by: ADALID GUADALUPE MD  Date:     10/03/17  Time:    18:37              Narrative:    CT angiogram of the chest.    Clinical indication: Shortness of breath.    Standard and MIPS/3-D images submitted.    There is severe/massive dilatation of the esophagus filled with food stuff. This is a chronic finding that has been demonstrated previously.    There is rather pronounced atheromatous change was scattered plaque formation along the aorta. No evidence of aneurysm formation or terri dissection.    There is opacification of the pulmonary arterial system without gross dilatation. There no definite filling defects within the major branches to suggest emboli.    The lungs show chronic changes bilaterally. Cannot exclude mild basilar congestion. Mild scarring. Cardiomegaly noted.    Within the upper portion of the abdomen, there are multiple low attenuation foci in the liver, incompletely seen and severely streak artifact but probably cysts. No acute bony abnormality is suggested.                             X-Ray Chest AP Portable (Final result)  Result time 10/03/17 16:01:31    Final result by Diego Hugo MD (10/03/17 16:01:31)                 Impression:     Superior mediastinal mass as above.      Electronically signed by: DIEGO HUGO MD  Date:     10/03/17  Time:    16:01              Narrative:    History: Cough    Normal heart size.  Prominent curvilinear opacity along the right superior mediastinum is in a location where previously there was markedly air distended esophagus on multiple prior studies, most recent a 3/4/15 chest x-ray.  This is suspicious for mass/superior mediastinal lymphadenopathy, but conceivably could be a markedly distended fluid filled esophagus.  Lung fields otherwise clear.                             CT Head Without  Contrast (Final result)  Result time 10/03/17 15:06:15   Procedure changed from CT Head W Wo Contrast     Final result by Darlin Monae MD (10/03/17 15:06:15)                 Impression:      Advanced atrophy with white matter degeneration.  No acute findings.        All CT scans at this facility use dose modulation, iterative reconstruction and/or weight based dosing when appropriate to reduce radiation dose to as low as reasonably achievable.       Electronically signed by: DARLIN MONAE MD  Date:     10/03/17  Time:    15:06              Narrative:    CT HEAD WITHOUT CONTRAST     History:  stroke    Technique:  Noncontrast CT of the brain.     Comparison: 03/05/2012.    Findings:  The ventricles are dilated consistent with generalized atrophy. Associated age-related white matter degeneration is present.     No significant acute findings are noted.

## 2017-10-05 NOTE — PT/OT/SLP EVAL
Speech Language Pathology      Janeth Tamez  MRN: [id number]    Patient not seen today secondary to pt havign an EGD  . Will follow-up tomorrow.    Enedelia Milner, MARJ-SLP

## 2017-10-05 NOTE — ASSESSMENT & PLAN NOTE
-Family notes history, and is on thickened liquid at nursing home.   -NPO  Speech eval and treat  -aspiration precautions

## 2017-10-05 NOTE — SUBJECTIVE & OBJECTIVE
Subjective:     Interval History: Pt history reviewed. She is agitated and pulling at wires/IV lines. She denies any complaints. I had a long discussion with the pts  (in person) and her daughter today (via phone) about her care. See assessment and plan for the full details.    Review of Systems   Unable to perform ROS: Dementia     Objective:     Vital Signs (Most Recent):  Temp: 98.8 °F (37.1 °C) (10/05/17 0758)  Pulse: 108 (10/05/17 0900)  Resp: 17 (10/05/17 0758)  BP: (!) 184/79 (10/05/17 0758)  SpO2: 98 % (10/05/17 0758) Vital Signs (24h Range):  Temp:  [98.4 °F (36.9 °C)-98.8 °F (37.1 °C)] 98.8 °F (37.1 °C)  Pulse:  [] 108  Resp:  [16-18] 17  SpO2:  [95 %-98 %] 98 %  BP: (162-184)/(72-79) 184/79     Weight: 81 kg (178 lb 9.2 oz) (10/04/17 1047)  Body mass index is 30.64 kg/m².      Intake/Output Summary (Last 24 hours) at 10/05/17 1209  Last data filed at 10/05/17 0455   Gross per 24 hour   Intake          2118.75 ml   Output                0 ml   Net          2118.75 ml       Lines/Drains/Airways     Peripheral Intravenous Line                 Peripheral IV - Single Lumen 10/03/17 1449 Left Forearm 1 day                Physical Exam   Constitutional: She appears well-developed and well-nourished.   Confused due to dementia.   Eyes: No scleral icterus.   Cardiovascular: Normal rate, regular rhythm and normal heart sounds.    No murmur heard.  Pulmonary/Chest: Effort normal and breath sounds normal. She has no wheezes. She has no rales.   Upper airway gurgling noted   Abdominal: Soft. Bowel sounds are normal. She exhibits no distension. There is no hepatosplenomegaly. There is no tenderness.   Musculoskeletal: She exhibits no edema.   Neurological:   Confused. Does not answer questions appropriately.   Psychiatric:   Confused. Some agitation and pulling at leads/IV   Nursing note and vitals reviewed.      Significant Labs:  CBC:   Recent Labs  Lab 10/03/17  1541 10/04/17  0518 10/05/17  0438    WBC 11.01 12.49  11.99 10.45   HGB 13.0 13.1  13.1 13.2   HCT 39.9 40.4  40.3 40.6    255  236 218     CMP:   Recent Labs  Lab 10/05/17  0438   *   CALCIUM 9.3   ALBUMIN 2.7*   PROT 6.8   *   K 3.5   CO2 28      BUN 8*   CREATININE 0.9   ALKPHOS 93   ALT 11   AST 14   BILITOT 0.6     Coagulation:   Recent Labs  Lab 10/04/17  0008   APTT 28.9         Significant Imaging:  CT: I have reviewed all results within the past 24 hours and my personal findings are:  Dilated, fluid and food filled esophagus  Esophagram from 2014 was reviewed     Scheduled Meds:   aspirin  81 mg Oral Daily    ciprofloxacin (CIPRO)400mg/200ml D5W IVPB  400 mg Intravenous Q12H    heparin (porcine)  5,000 Units Subcutaneous Q8H    metoprolol  2.5 mg Intravenous Q12H    pantoprazole  40 mg Intravenous Daily     Continuous Infusions:   sodium chloride 0.9% 75 mL/hr at 10/05/17 0625     PRN Meds:.acetaminophen, albuterol sulfate, dextrose 50%, dextrose 50%, glucagon (human recombinant), glucose, glucose, hydrALAZINE, lorazepam, ondansetron, tramadol

## 2017-10-05 NOTE — ANESTHESIA POSTPROCEDURE EVALUATION
"Anesthesia Post Evaluation    Patient: Janeth Tamez    Procedure(s) Performed: Procedure(s) (LRB):  ESOPHAGOGASTRODUODENOSCOPY (EGD) (N/A)    Final Anesthesia Type: general  Patient location during evaluation: PACU  Patient participation: Yes- Able to Participate  Level of consciousness: awake and alert and responds to stimulation  Post-procedure vital signs: reviewed and stable  Pain management: adequate  Airway patency: patent  PONV status at discharge: No PONV  Anesthetic complications: no      Cardiovascular status: stable, blood pressure returned to baseline and hemodynamically stable  Respiratory status: unassisted, spontaneous ventilation and room air  Hydration status: euvolemic  Follow-up not needed.        Visit Vitals  BP (!) 160/78 (BP Location: Right arm, Patient Position: Lying)   Pulse (!) 114   Temp 36.9 °C (98.5 °F) (Oral)   Resp 17   Ht 5' 4" (1.626 m)   Wt 81 kg (178 lb 9.2 oz)   LMP  (LMP Unknown)   SpO2 98%   Breastfeeding? No   BMI 30.65 kg/m²       Pain/Dane Score: Pain Assessment Performed: Yes (10/5/2017  5:12 PM)  Presence of Pain: denies (10/5/2017  5:12 PM)  Dane Score: 7 (10/5/2017  3:30 PM)      "

## 2017-10-05 NOTE — PLAN OF CARE
Problem: Patient Care Overview  Goal: Plan of Care Review  Outcome: Ongoing (interventions implemented as appropriate)  Fall prevention precautions maintained, pt remained free of falls throughout shift, call bell and personal items within reach, pt turned Q2 hours, pt remains disoriented to time, place, and situation. 24 hour chart check completed. Will continue to monitor

## 2017-10-05 NOTE — PROGRESS NOTES
"[facility] Hospital Medicine  Progress Note    Patient Name: Janeth Tamez  MRN: [id number]  Patient Class: IP- Inpatient   Admission Date: 10/3/2017  Length of Stay: 2 days  Attending Physician: Jenelle Harris MD  Primary Care Provider: Primary Doctor No        Subjective:     Principal Problem:Dysphagia    HPI:  Janeth Tamez is a 89 y.o. female patient , with history of dementia, who presented to the Emergency Department for AMS which onset at 11:30 AM. Symptoms are constant and moderate in severity. Per [facility], pt is not at her baseline. Per pt's family, pt has had a stroke before and she began to act strangely today after, Pt was coughing up "slime", SOB, and in distress. The patient was sent from [facility] for further treatment. The patient was evaluated in the ER and found to have a UTI, was treated and being discharged, when the patient began to vomit after oral intake in the Er. The patient was re evaluated by Dr. Kim evaluated. Dr. Kim discussed with the patient's  possible forms of treatment, including placement of a G Tube. Pt's  verbalizes consent at this time for G Tube placement. Dr. Kim discussed the pt's case with Dr. Clark () who states that pt's sxs are most likely related to achalasia or a disrupted esophagus, in which case a feeding tube will need to be placed by interventional radiology. Dr. Clark states that he will f/u with the pt in the AM..   No further complaints or concerns at this time. HPI limited due to pt AMS and dementia.      Hospital Course:  The pt was admitted with severe dysphagia. CT chest showed Severe/massive dilatation of the esophagus filled with food stuff. Pt is NPO for now. She has dementia. Spouse is at bedside. Care discussed with GI who recommended IR G tube placement. Family requested EGD. GI agreed to EGD. Pt's family then requested 2nd opinion by Dr. Ulrich at [facility]. [facility] declined transfer. Family " agreed to stay at [facility] but request a different GI doctor. Discussed care with Dr. Pedraza who recommended Dr. Berman to see pt.           Review of Systems   Unable to perform ROS: Dementia     Objective:     Vital Signs (Most Recent):  Temp: 98.8 °F (37.1 °C) (10/05/17 0758)  Pulse: 108 (10/05/17 0900)  Resp: 17 (10/05/17 0758)  BP: (!) 184/79 (10/05/17 0758)  SpO2: 98 % (10/05/17 0758) Vital Signs (24h Range):  Temp:  [98.4 °F (36.9 °C)-98.8 °F (37.1 °C)] 98.8 °F (37.1 °C)  Pulse:  [] 108  Resp:  [16-18] 17  SpO2:  [95 %-98 %] 98 %  BP: (162-184)/(72-79) 184/79     Weight: 81 kg (178 lb 9.2 oz)  Body mass index is 30.64 kg/m².    Intake/Output Summary (Last 24 hours) at 10/05/17 1210  Last data filed at 10/05/17 0455   Gross per 24 hour   Intake          2118.75 ml   Output                0 ml   Net          2118.75 ml      Physical Exam   Constitutional: She appears well-developed and well-nourished. No distress.   HENT:   Head: Normocephalic and atraumatic.   Nose: Nose normal.   Mouth/Throat: Oropharynx is clear and moist.   Eyes: Conjunctivae are normal. No scleral icterus.   Neck: Neck supple. No tracheal deviation present.   Cardiovascular: Normal rate, regular rhythm, normal heart sounds and intact distal pulses.  Exam reveals no gallop and no friction rub.    No murmur heard.  Pulmonary/Chest: Effort normal and breath sounds normal. No stridor. No respiratory distress. She has no wheezes. She has no rales. She exhibits no tenderness.   Upper airway congestion    Abdominal: Soft. Bowel sounds are normal. She exhibits no distension and no mass. There is no tenderness. There is no rebound and no guarding.   Musculoskeletal: She exhibits no edema, tenderness or deformity.   Neurological: She is alert. No cranial nerve deficit. She exhibits normal muscle tone. Coordination normal.   Oriented to self only    Skin: Skin is warm and dry. No rash noted. She is not diaphoretic. No erythema. No pallor.    Psychiatric: She has a normal mood and affect. Her behavior is normal.   Nursing note and vitals reviewed.      Significant Labs:   BMP:   Recent Labs  Lab 10/03/17  1541  10/05/17  0438   *  < > 164*     < > 135*   K 4.4  < > 3.5     < > 100   CO2 23  < > 28   BUN 14  < > 8*   CREATININE 1.1  < > 0.9   CALCIUM 8.7  < > 9.3   MG 1.8  --   --    < > = values in this interval not displayed.  CBC:     Recent Labs  Lab 10/03/17  1541 10/04/17  0518 10/05/17  0438   WBC 11.01 12.49  11.99 10.45   HGB 13.0 13.1  13.1 13.2   HCT 39.9 40.4  40.3 40.6    255  236 218     CMP:     Recent Labs  Lab 10/04/17  0517 10/04/17  0518 10/05/17  0438    138 135*   K 3.9 4.0 3.5    101 100   CO2 29 28 28   * 162* 164*   BUN 13 13 8*   CREATININE 1.0 1.0 0.9   CALCIUM 9.3 9.3 9.3   PROT 7.0 7.0 6.8   ALBUMIN 2.9* 2.9* 2.7*   BILITOT 0.4 0.4 0.6   ALKPHOS 96 96 93   AST 12 12 14   ALT 10 10 11   ANIONGAP 7* 9 7*   EGFRNONAA 48* 48* 55*     All pertinent labs within the past 24 hours have been reviewed.    Significant Imaging:   Imaging Results          X-Ray Chest 1 View (Final result)  Result time 10/04/17 08:28:01   Procedure changed from Chest X-ray, PA And Lateral     Final result by Kj Rizvi MD (10/04/17 08:28:01)                 Impression:     No adverse interval change      Electronically signed by: KJ RIZVI MD  Date:     10/04/17  Time:    08:28              Narrative:    History: Aspiration risk    Comparison: 10/3/17    Result: Single view of the chest.       Right paratracheal stripe thickening which correlates with large amount of material within the esophagus on CT exam. In comparison to the prior study, there is no adverse interval changes.                             CTA Chest Non-Coronary (Final result)  Result time 10/03/17 18:37:25    Final result by Kj Bae III, MD (10/03/17 18:37:25)                 Impression:        1. Severe/massive  dilatation of the esophagus filled with food stuff. This is a chronic finding.    2. No gross evidence of acute pulmonary emboli. There is moderately pronounced atheromatous change along the aorta without gross evidence of aneurysm formation.    3. Otherwise as above.      Electronically signed by: ADALID GUADALUPE MD  Date:     10/03/17  Time:    18:37              Narrative:    CT angiogram of the chest.    Clinical indication: Shortness of breath.    Standard and MIPS/3-D images submitted.    There is severe/massive dilatation of the esophagus filled with food stuff. This is a chronic finding that has been demonstrated previously.    There is rather pronounced atheromatous change was scattered plaque formation along the aorta. No evidence of aneurysm formation or terri dissection.    There is opacification of the pulmonary arterial system without gross dilatation. There no definite filling defects within the major branches to suggest emboli.    The lungs show chronic changes bilaterally. Cannot exclude mild basilar congestion. Mild scarring. Cardiomegaly noted.    Within the upper portion of the abdomen, there are multiple low attenuation foci in the liver, incompletely seen and severely streak artifact but probably cysts. No acute bony abnormality is suggested.                             X-Ray Chest AP Portable (Final result)  Result time 10/03/17 16:01:31    Final result by Diego Hugo MD (10/03/17 16:01:31)                 Impression:     Superior mediastinal mass as above.      Electronically signed by: DIEGO HUGO MD  Date:     10/03/17  Time:    16:01              Narrative:    History: Cough    Normal heart size.  Prominent curvilinear opacity along the right superior mediastinum is in a location where previously there was markedly air distended esophagus on multiple prior studies, most recent a 3/4/15 chest x-ray.  This is suspicious for mass/superior mediastinal lymphadenopathy, but  conceivably could be a markedly distended fluid filled esophagus.  Lung fields otherwise clear.                             CT Head Without Contrast (Final result)  Result time 10/03/17 15:06:15   Procedure changed from CT Head W Wo Contrast     Final result by Darlin Monae MD (10/03/17 15:06:15)                 Impression:      Advanced atrophy with white matter degeneration.  No acute findings.        All CT scans at this facility use dose modulation, iterative reconstruction and/or weight based dosing when appropriate to reduce radiation dose to as low as reasonably achievable.       Electronically signed by: DARLIN MONAE MD  Date:     10/03/17  Time:    15:06              Narrative:    CT HEAD WITHOUT CONTRAST     History:  stroke    Technique:  Noncontrast CT of the brain.     Comparison: 03/05/2012.    Findings:  The ventricles are dilated consistent with generalized atrophy. Associated age-related white matter degeneration is present.     No significant acute findings are noted.                            Assessment/Plan:      * Dysphagia    -Family notes history, and is on thickened liquid at nursing home.   -NPO  Speech eval and treat  -aspiration precautions            Esophageal obstruction    -NPO  -CTA Chest showed Severe/massive dilatation of the esophagus filled with food stuff.   GI recommended EGD and likely IR placement of G tube  -monitor for aspiration .        Acute cystitis    De-escalate to IV Cipro   Urine culture pending        HTN (hypertension)    -Hold PO meds  -Treat with IV   -PRN as well           Dementia    Monitor  Treat behavior as need          DM (diabetes mellitus)    NISS          VTE Risk Mitigation         Ordered     heparin (porcine) injection 5,000 Units  Every 8 hours     Route:  Subcutaneous        10/03/17 2247     Medium Risk of VTE  Once      10/03/17 2247     Reason for No Pharmacological VTE Prophylaxis  Once      10/03/17 2206     Reason for no  Mechanical VTE Prophylaxis  Once      10/03/17 8134              Chrystal Coon NP  Department of Hospital Medicine   [facility]

## 2017-10-05 NOTE — PHYSICIAN QUERY
PT Name: Janeth Tamez  MR #: [id number]    Physician Query Form - Nutrition Clarification     CDS/: Pura Fenton               Contact information: [email or web address]     This form is a permanent document in the medical record.     Query Date: October 5, 2017    By submitting this query, we are merely seeking further clarification of documentation.. Please utilize your independent clinical judgment when addressing the question(s) below.    The Medical record contains the following:   Indicators  Supporting Clinical Findings Location in Medical Record   x % of Estimated Energy Intake over a time frame from p.o., TF, or TPN Decreased appetite for the past month  Failed RN bedside dysphagia screening.   % Intake of Estimated Energy Needs: 0 - 25 %   % Meal Intake: NPO    Dietitian PN 10/04    Weight Status over a time frame      Subcutaneous Fat and/or Muscle Loss      Fluid Accumulation or Edema      Reduced  Strength      Wt / BMI / Usual Body Weight      Delayed Wound Healing / Failure to Thrive     x Acute or Chronic Illness Dementia   High risk for aspiration   Dilated esophagus   likely related to end stage achalasia vs obstruction    Esophagus has likely stopped working   Unlikely the patient can follow commands    Esophagus distended  on an evaluation 3 years ago suggests a progressive process     Vomit after oral intake   Encephalopathy, due to UTI   Dysphagia  DM    Nutrition Problem   Inadequate energy intake    GI Consult Note            GI Consult Note Attestation               H&P          Dietitian PN 10/04    Medication     x Treatment Recommended G-tube placement by IR due to risk of aspiration with peg tube placement.     If unable to advance diet initiate continuous TF using Diabetisource.   GI Consult Note          Dietitian PN 10/04, Recommentation/Intervention   x Other Factors Affecting Nutritional Intake: NPO, compromised airway, impaired cognitive status/motor control,  decreased appetite, difficulty/impaired swallowing    Dietitian PN 10/04     AND / ASPEN Clinical Characteristics (October 2011)  A minimum of two characteristics is recommended for diagnosing either moderate or severe malnutrition   Mild Malnutrition Moderate Malnutrition Severe Malnutrition   Energy Intake from p.o., TF or TPN. < 75% intake of estimated energy needs for less than 7 days < 75% intake of estimated energy needs for greater than 7 days < 50% intake of estimated energy needs for > 5 days   Weight Loss 1-2% in 1 month  5% in 3 months  7.5% in 6 months  10% in 1 year 1-2 % in 1 week  5% in 1 month  7.5% in 3 months  10% in 6 months  20% in 1 year > 2% in 1 week  > 5% in 1 month  > 7.5% in 3 months  > 10% in 6 months  > 20% in 1 year   Physical Findings     None *Mild subcutaneous fat and/or muscle loss  *Mild fluid accumulation  *Stage II decubitus  *Surgical wound or non-healing wound *Mod/severe subcutaneous fat and/or muscle loss  *Mod/severe fluid accumulation  *Stage III or IV decubitus  *Non-healing surgical wound     Provider, please specify diagnosis or diagnoses associated with above clinical findings.    [xx ] Mild Protein-Calorie Malnutrition  [ ] Moderate Protein-Calorie Malnutrition  [ ] Anorexia  [ ] Other Nutritional Diagnosis (please specify): ____________________________________  [ ] Other: ________________________________  [ ] Clinically Undetermined    Please document in your progress notes daily for the duration of treatment until resolved and include in your discharge summary.

## 2017-10-05 NOTE — PROGRESS NOTES
"[facility] Hospital Medicine  Progress Note    Patient Name: Janeth Tamez  MRN: [id number]  Patient Class: IP- Inpatient   Admission Date: 10/3/2017    Attending Physician: Jenelle Harris MD  Primary Care Provider: Primary Doctor No        Subjective:     Principal Problem:Dysphagia    HPI:  Janeth Tamez is a 89 y.o. female patient , with history of dementia, who presented to the Emergency Department for AMS which onset at 11:30 AM. Symptoms are constant and moderate in severity. Per [facility], pt is not at her baseline. Per pt's family, pt has had a stroke before and she began to act strangely today after, Pt was coughing up "slime", SOB, and in distress. The patient was sent from [facility] for further treatment. The patient was evaluated in the ER and found to have a UTI, was treated and being discharged, when the patient began to vomit after oral intake in the Er. The patient was re evaluated by Dr. Kim evaluated. Dr. Kim discussed with the patient's  possible forms of treatment, including placement of a G Tube. Pt's  verbalizes consent at this time for G Tube placement. Dr. Kim discussed the pt's case with Dr. Clark (GI) who states that pt's sxs are most likely related to achalasia or a disrupted esophagus, in which case a feeding tube will need to be placed by interventional radiology. Dr. Clark states that he will f/u with the pt in the AM..   No further complaints or concerns at this time. HPI limited due to pt AMS and dementia.      Hospital Course:  The pt was admitted with severe dysphagia. CT chest showed Severe/massive dilatation of the esophagus filled with food stuff. Pt is NPO for now. She has dementia. Spouse is at bedside. Care discussed with GI who will evaluate pt .        Review of Systems   Unable to perform ROS: Dementia     Objective:     Vital Signs (Most Recent):  Temp: 98.4 °F (36.9 °C) (10/04/17 1233)  Pulse: 83 (10/04/17 " 1233)  Resp: 18 (10/04/17 1233)  BP: (!) 180/79 (10/04/17 1233)  SpO2: 97 % (10/04/17 1233) Vital Signs (24h Range):  Temp:  [97.4 °F (36.3 °C)-98.8 °F (37.1 °C)] 98.4 °F (36.9 °C)  Pulse:  [61-83] 83  Resp:  [16-20] 18  SpO2:  [94 %-98 %] 97 %  BP: (134-183)/(61-84) 180/79     Weight: 81 kg (178 lb 9.2 oz)  Body mass index is 30.64 kg/m².    Intake/Output Summary (Last 24 hours) at 10/04/17 1443  Last data filed at 10/04/17 1200   Gross per 24 hour   Intake            572.5 ml   Output                0 ml   Net            572.5 ml      Physical Exam   Constitutional: She appears well-developed and well-nourished. No distress.   HENT:   Head: Normocephalic and atraumatic.   Nose: Nose normal.   Mouth/Throat: Oropharynx is clear and moist.   Eyes: Conjunctivae are normal. No scleral icterus.   Neck: Neck supple. No tracheal deviation present.   Cardiovascular: Normal rate, regular rhythm, normal heart sounds and intact distal pulses.  Exam reveals no gallop and no friction rub.    No murmur heard.  Pulmonary/Chest: Effort normal and breath sounds normal. No stridor. No respiratory distress. She has no wheezes. She has no rales. She exhibits no tenderness.   Upper airway congestion    Abdominal: Soft. Bowel sounds are normal. She exhibits no distension and no mass. There is no tenderness. There is no rebound and no guarding.   Musculoskeletal: She exhibits no edema, tenderness or deformity.   Neurological: She is alert. No cranial nerve deficit. She exhibits normal muscle tone. Coordination normal.   Oriented to self only    Skin: Skin is warm and dry. No rash noted. She is not diaphoretic. No erythema. No pallor.   Psychiatric: She has a normal mood and affect. Her behavior is normal.   Nursing note and vitals reviewed.      Significant Labs:   BMP:   Recent Labs  Lab 10/03/17  1541  10/04/17  0518   *  < > 162*     < > 138   K 4.4  < > 4.0     < > 101   CO2 23  < > 28   BUN 14  < > 13   CREATININE  1.1  < > 1.0   CALCIUM 8.7  < > 9.3   MG 1.8  --   --    < > = values in this interval not displayed.  CBC:   Recent Labs  Lab 10/03/17  1541 10/04/17  0518   WBC 11.01 12.49  11.99   HGB 13.0 13.1  13.1   HCT 39.9 40.4  40.3    255  236     CMP:   Recent Labs  Lab 10/03/17  1541 10/04/17  0517 10/04/17  0518    137 138   K 4.4 3.9 4.0    101 101   CO2 23 29 28   * 161* 162*   BUN 14 13 13   CREATININE 1.1 1.0 1.0   CALCIUM 8.7 9.3 9.3   PROT 6.7 7.0 7.0   ALBUMIN 2.8* 2.9* 2.9*   BILITOT 0.4 0.4 0.4   ALKPHOS 91 96 96   AST 13 12 12   ALT 10 10 10   ANIONGAP 10 7* 9   EGFRNONAA 43* 48* 48*     All pertinent labs within the past 24 hours have been reviewed.    Significant Imaging:   Imaging Results          X-Ray Chest 1 View (Final result)  Result time 10/04/17 08:28:01   Procedure changed from Chest X-ray, PA And Lateral     Final result by Kj Rizvi MD (10/04/17 08:28:01)                 Impression:     No adverse interval change      Electronically signed by: KJ RIZVI MD  Date:     10/04/17  Time:    08:28              Narrative:    History: Aspiration risk    Comparison: 10/3/17    Result: Single view of the chest.       Right paratracheal stripe thickening which correlates with large amount of material within the esophagus on CT exam. In comparison to the prior study, there is no adverse interval changes.                             CTA Chest Non-Coronary (Final result)  Result time 10/03/17 18:37:25    Final result by Kj Guadalupe III, MD (10/03/17 18:37:25)                 Impression:        1. Severe/massive dilatation of the esophagus filled with food stuff. This is a chronic finding.    2. No gross evidence of acute pulmonary emboli. There is moderately pronounced atheromatous change along the aorta without gross evidence of aneurysm formation.    3. Otherwise as above.      Electronically signed by: KJ GUADALUPE MD  Date:     10/03/17  Time:    18:37               Narrative:    CT angiogram of the chest.    Clinical indication: Shortness of breath.    Standard and MIPS/3-D images submitted.    There is severe/massive dilatation of the esophagus filled with food stuff. This is a chronic finding that has been demonstrated previously.    There is rather pronounced atheromatous change was scattered plaque formation along the aorta. No evidence of aneurysm formation or terri dissection.    There is opacification of the pulmonary arterial system without gross dilatation. There no definite filling defects within the major branches to suggest emboli.    The lungs show chronic changes bilaterally. Cannot exclude mild basilar congestion. Mild scarring. Cardiomegaly noted.    Within the upper portion of the abdomen, there are multiple low attenuation foci in the liver, incompletely seen and severely streak artifact but probably cysts. No acute bony abnormality is suggested.                             X-Ray Chest AP Portable (Final result)  Result time 10/03/17 16:01:31    Final result by Diego Hugo MD (10/03/17 16:01:31)                 Impression:     Superior mediastinal mass as above.      Electronically signed by: DIEGO HUGO MD  Date:     10/03/17  Time:    16:01              Narrative:    History: Cough    Normal heart size.  Prominent curvilinear opacity along the right superior mediastinum is in a location where previously there was markedly air distended esophagus on multiple prior studies, most recent a 3/4/15 chest x-ray.  This is suspicious for mass/superior mediastinal lymphadenopathy, but conceivably could be a markedly distended fluid filled esophagus.  Lung fields otherwise clear.                             CT Head Without Contrast (Final result)  Result time 10/03/17 15:06:15   Procedure changed from CT Head W Wo Contrast     Final result by Adalberto Crystal MD (10/03/17 15:06:15)                 Impression:      Advanced atrophy with  white matter degeneration.  No acute findings.        All CT scans at this facility use dose modulation, iterative reconstruction and/or weight based dosing when appropriate to reduce radiation dose to as low as reasonably achievable.       Electronically signed by: DARLIN MONAE MD  Date:     10/03/17  Time:    15:06              Narrative:    CT HEAD WITHOUT CONTRAST     History:  stroke    Technique:  Noncontrast CT of the brain.     Comparison: 03/05/2012.    Findings:  The ventricles are dilated consistent with generalized atrophy. Associated age-related white matter degeneration is present.     No significant acute findings are noted.                            Assessment/Plan:      * Dysphagia    -Family notes history, and is on thickened liquid at nursing home.   -NPO  -Swallow study, Speech eval and treat  -aspiration precautions            Esophageal obstruction    -NPO  -CTA Chest reviewed: 1. Severe/massive dilatation of the esophagus filled with food stuff. This is a chronic finding..  Await GI evaluation   -monitor for aspiration .        Acute cystitis    De-escalate to IV Cipro   Urine culture        HTN (hypertension)    -Hold PO meds  -Treat with IV   -PRN as well           Dementia    Monitor  Treat behavior as need          DM (diabetes mellitus)    A1C  IV fluids  accu check  NPO  monitor          VTE Risk Mitigation         Ordered     heparin (porcine) injection 5,000 Units  Every 8 hours     Route:  Subcutaneous        10/03/17 2247     Medium Risk of VTE  Once      10/03/17 2247     Reason for No Pharmacological VTE Prophylaxis  Once      10/03/17 2206     Reason for no Mechanical VTE Prophylaxis  Once      10/03/17 2206              Chrystal Coon NP  Department of Hospital Medicine   [facility]

## 2017-10-05 NOTE — PLAN OF CARE
Problem: Patient Care Overview  Goal: Plan of Care Review  Outcome: Ongoing (interventions implemented as appropriate)  Patient remains free of injury and safety precautions maintained. Afebrile. Pain controlled. 12 hour chart check.

## 2017-10-05 NOTE — H&P
Short Stay Endoscopy History and Physical    PCP - Primary Doctor No    Procedure - EGD  ASA - 4  Mallampati - per anesthesia  History of Anesthesia problems - no  Family history Anesthesia problems -  no     HPI:  This is a 89 y.o. female here for evaluation of :     Reflux - no  Dysphagia - yes, achalasia  Abdominal pain - no  Diarrhea - no  Anemia - no  GI bleeding - no    ROS:  CONSTITUTIONAL: Denies weight change,  fatigue, fevers, chills, night sweats.  CARDIOVASCULAR: Denies chest pain, shortness of breath, orthopnea and edema.  RESPIRATORY: Denies cough, hemoptysis, dyspnea, and wheezing.  GI: See HPI.    Medical History:   Past Medical History:   Diagnosis Date    Arthritis     Degenerative joint disease     Dementia     Diabetes mellitus     HBP (high blood pressure)     Hepatitis C     History of congestive heart failure     History of frequent urinary tract infections     Peripheral neuropathy     Pulmonary embolism        Surgical History:   Past Surgical History:   Procedure Laterality Date    2 abdominal surgeries      atopic pregnancy      CATARACT EXTRACTION W/  INTRAOCULAR LENS IMPLANT  OD 10/30/13    CATARACT EXTRACTION W/  INTRAOCULAR LENS IMPLANT  OS 10/22/14    GALLBLADDER SURGERY      IV filter for pulmonary embolism      TONSILLECTOMY, ADENOIDECTOMY         Family History:  Family History   Problem Relation Age of Onset    Hypertension Mother     Hypertension Sister     Cancer Sister     Cancer Brother        Social History:   Social History   Substance Use Topics    Smoking status: Never Smoker    Smokeless tobacco: Never Used    Alcohol use No       Allergies: Reviewed    Medications:   No current facility-administered medications on file prior to encounter.      Current Outpatient Prescriptions on File Prior to Encounter   Medication Sig Dispense Refill    albuterol (ACCUNEB) 1.25 mg/3 mL Nebu Take 2.5 mg by nebulization every 6 (six) hours as needed.        amlodipine (NORVASC) 5 MG tablet Take 1 tablet (5 mg total) by mouth once daily. 30 tablet 0    ascorbic acid (VITAMIN C) 500 MG tablet Take 500 mg by mouth 2 (two) times daily.      ASPIRIN ORAL Take 81 mg by mouth once daily.       atorvastatin (LIPITOR) 20 MG tablet Take 1 tablet (20 mg total) by mouth every evening. 30 tablet 0    cyanocobalamin, vitamin B-12, 1,000 mcg TbSR Take by mouth once daily.      loratadine (CLARITIN) 10 mg tablet Take 10 mg by mouth once daily.      lorazepam (ATIVAN) 1 MG tablet 2 (two) times daily.       metoprolol tartrate (LOPRESSOR) 25 MG tablet       paroxetine (PAXIL) 20 MG tablet Take 10 mg by mouth every morning.       POLYETHYLENE GLYCOL 3350 (MIRALAX ORAL) Take by mouth.      PROTEIN SUPPLEMENT (PROMOD PROTEIN ORAL) Take by mouth.      senna (SENOKOT) 8.6 mg tablet Take 1 tablet by mouth once daily.      hydrocodone-acetaminophen 5-325mg (NORCO) 5-325 mg per tablet Take 1 tablet by mouth every 4 (four) hours as needed for Pain. 18 tablet 0       Physical Exam:  Vital Signs:   Vitals:    10/05/17 1302   BP: (!) 173/86   Pulse: 96   Resp: 16   Temp: 98.5 °F (36.9 °C)     General Appearance: Well appearing in no acute distress  ENT: OP clear  Chest: CTA B  CV: RRR, no m/r/g  Abd: s/nt/nd/nabs  Ext: no edema    Labs:  Reviewed    Plan:  I have explained the risks and benefits of endoscopy procedures to the patient's  including but not limited to bleeding, perforation, infection, and death. The patient's  wishes to proceed.

## 2017-10-05 NOTE — ANESTHESIA PREPROCEDURE EVALUATION
10/05/2017  Janeth Tamez is a 89 y.o., female.    Pre-op Assessment    I have reviewed the Patient Summary Reports.     I have reviewed the Nursing Notes.   I have reviewed the Medications.     Review of Systems  Anesthesia Hx:  No problems with previous Anesthesia  Denies Family Hx of Anesthesia complications.   Denies Personal Hx of Anesthesia complications.   Cardiovascular:   Exercise tolerance: poor Hypertension, well controlled Past MI  ECG has been reviewed.    Pulmonary:   Pneumonia (2 weeks ago) Home 02   Hepatic/GI:   Liver Disease, Hepatitis, C akalasia   Musculoskeletal:   Arthritis     Neurological:   Neuromuscular Disease,    Endocrine:   Diabetes, well controlled, type 2    Psych:   Psychiatric History          Physical Exam  General:  Obesity       Chest/Lungs:  Chest/Lungs Findings:    Heart/Vascular:  Heart Findings: Rate: Normal  Rhythm: Regular Rhythm  Sounds: Normal             Anesthesia Plan  Type of Anesthesia, risks & benefits discussed:  Anesthesia Type:  general  Patient's Preference:   Intra-op Monitoring Plan:   Intra-op Monitoring Plan Comments:   Post Op Pain Control Plan:   Post Op Pain Control Plan Comments:   Induction:    Beta Blocker:  Patient is not currently on a Beta-Blocker (No further documentation required).       Informed Consent: Patient understands risks and agrees with Anesthesia plan.  Questions answered.   ASA Score: 4  emergent   Day of Surgery Review of History & Physical: I have interviewed and examined the patient. I have reviewed the patient's H&P dated:  There are no significant changes.

## 2017-10-05 NOTE — PROGRESS NOTES
[facility]  Gastroenterology  Progress Note    Patient Name: Janeth Tamez  MRN: [id number]  Admission Date: 10/3/2017  Hospital Length of Stay: 2 days  Code Status: DNR   Attending Provider: Jenelle Harris MD  Consulting Provider: Sohan Berman MD  Primary Care Physician: Primary Doctor No  Principal Problem: Dysphagia      Subjective:     Interval History: Pt history reviewed. She is agitated and pulling at wires/IV lines. She denies any complaints. I had a long discussion with the pts  (in person) and her daughter today (via phone) about her care. See assessment and plan for the full details.    Review of Systems   Unable to perform ROS: Dementia     Objective:     Vital Signs (Most Recent):  Temp: 98.8 °F (37.1 °C) (10/05/17 0758)  Pulse: 108 (10/05/17 0900)  Resp: 17 (10/05/17 0758)  BP: (!) 184/79 (10/05/17 0758)  SpO2: 98 % (10/05/17 0758) Vital Signs (24h Range):  Temp:  [98.4 °F (36.9 °C)-98.8 °F (37.1 °C)] 98.8 °F (37.1 °C)  Pulse:  [] 108  Resp:  [16-18] 17  SpO2:  [95 %-98 %] 98 %  BP: (162-184)/(72-79) 184/79     Weight: 81 kg (178 lb 9.2 oz) (10/04/17 1047)  Body mass index is 30.64 kg/m².      Intake/Output Summary (Last 24 hours) at 10/05/17 1209  Last data filed at 10/05/17 0455   Gross per 24 hour   Intake          2118.75 ml   Output                0 ml   Net          2118.75 ml       Lines/Drains/Airways     Peripheral Intravenous Line                 Peripheral IV - Single Lumen 10/03/17 1449 Left Forearm 1 day                Physical Exam   Constitutional: She appears well-developed and well-nourished.   Confused due to dementia.   Eyes: No scleral icterus.   Cardiovascular: Normal rate, regular rhythm and normal heart sounds.    No murmur heard.  Pulmonary/Chest: Effort normal and breath sounds normal. She has no wheezes. She has no rales.   Upper airway gurgling noted   Abdominal: Soft. Bowel sounds are normal. She exhibits no distension. There is no  "hepatosplenomegaly. There is no tenderness.   Musculoskeletal: She exhibits no edema.   Neurological:   Confused. Does not answer questions appropriately.   Psychiatric:   Confused. Some agitation and pulling at leads/IV   Nursing note and vitals reviewed.      Significant Labs:  CBC:   Recent Labs  Lab 10/03/17  1541 10/04/17  0518 10/05/17  0438   WBC 11.01 12.49  11.99 10.45   HGB 13.0 13.1  13.1 13.2   HCT 39.9 40.4  40.3 40.6    255  236 218     CMP:   Recent Labs  Lab 10/05/17  0438   *   CALCIUM 9.3   ALBUMIN 2.7*   PROT 6.8   *   K 3.5   CO2 28      BUN 8*   CREATININE 0.9   ALKPHOS 93   ALT 11   AST 14   BILITOT 0.6     Coagulation:   Recent Labs  Lab 10/04/17  0008   APTT 28.9         Significant Imaging:  CT: I have reviewed all results within the past 24 hours and my personal findings are:  Dilated, fluid and food filled esophagus  Esophagram from 2014 was reviewed     Scheduled Meds:   aspirin  81 mg Oral Daily    ciprofloxacin (CIPRO)400mg/200ml D5W IVPB  400 mg Intravenous Q12H    heparin (porcine)  5,000 Units Subcutaneous Q8H    metoprolol  2.5 mg Intravenous Q12H    pantoprazole  40 mg Intravenous Daily     Continuous Infusions:   sodium chloride 0.9% 75 mL/hr at 10/05/17 0625     PRN Meds:.acetaminophen, albuterol sulfate, dextrose 50%, dextrose 50%, glucagon (human recombinant), glucose, glucose, hydrALAZINE, lorazepam, ondansetron, tramadol      Assessment/Plan:     Achalasia    I had a very long discussion with the patient's  and later with her daughter. We discussed that the patient appears to have achalasia and that this has likely been going on for many years. 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 We discussed the fact that she also has terminal dementia " and likely has a short life expectancy. They both have a good understanding of this and have a clear expectation for comfort measures for the patient and have no expectations of extending her life with aggressive therapy. We discussed several options. We reviewed the role for PEG tube placement. They are not interested in this as they are concerned that she might pull out the PEG tube. I also discussed that PEG tube placement does not improve quality of life or extend life in patients with end stage dementia. We discussed that the role of the PEG tube would be to bypass the esophagus and allow for nutrition to be given into the stomach and hopefully avoid backup of fluid in the esophagus and reduce the risk of aspiration. They understand this and accept that aspiration and pneumonia are risks that they are willing to accept. We discussed performing an EGD to remove any residual food from the esophagus and to attempt to inject the LES with Botox in an effort to provide some improvement. They understand that even if this is successful that she would not be able to eat a regular diet and I would only recommend that she be on a liquid diet for the remainder of her life. They understand this and agree. We also discussed the fact that she appears to have a large amount of food in the esophagus based on imaging. I recommend that the patient be intubated for the procedure in order to protect her airway and minimize the risk of aspiration during the procedure. We also discussed the possibility that she may not be able to be successfully intubated after the procedure. They understand this and would like to proceed. We also discussed her DNR status and the issues associated with this during her procedure. They would like to reverse the DNR for the procedure.     In summary, all risks and benefits have been discussed with the patient's  and daughter and we are in agreement that the plan will be as follows:  - EGD with  removal of food from the esophagus today.  - Botox injection into LES during EGD  - Start a liquid diet after the procedure with no plans to advance. Risk of aspiration and pneumonia leading to death was also discussed with them both and they understand this and this is an acceptable risk to them.            Thank you for your consult. I will follow-up with patient. Please contact us if you have any additional questions.    Sohan Berman MD  Gastroenterology  [facility]

## 2017-10-05 NOTE — ASSESSMENT & PLAN NOTE
-NPO  -CTA Chest showed Severe/massive dilatation of the esophagus filled with food stuff.   GI recommended EGD and likely IR placement of G tube  -monitor for aspiration .

## 2017-10-05 NOTE — BRIEF OP NOTE
[facility] -   Brief Operative Note    SUMMARY     Surgery Date: 10/5/2017     Surgeon(s) and Role:     * Sohan Berman MD - Primary    Assisting Surgeon: None    Pre-op Diagnosis:  Esophageal dysphagia [R13.10]  Achalasia [K22.0]    Post-op Diagnosis:  Post-Op Diagnosis Codes:     * Esophageal dysphagia [R13.10]     * Achalasia [K22.0]    Procedure(s) (LRB):  ESOPHAGOGASTRODUODENOSCOPY (EGD) (N/A)    Anesthesia: Monitor Anesthesia Care    Description of Procedure: EGD    Description of the findings of the procedure:   Esophagus severely dilated and filled from UES to LES with old food. This was removed with lavage and suction. About 1L of old food was removed from the esophagus. The LES was tight but the scope was able to traverse this. The stomach was normal. There was mild duodenitis. Botox was injected into the LES.    Recs: Liquid diet (do not attempt to advance beyond this).    Estimated Blood Loss: * No values recorded between 10/5/2017 12:00 AM and 10/5/2017  2:49 PM *         Specimens:   Specimen (12h ago through future)    None

## 2017-10-05 NOTE — ANESTHESIA RELEASE NOTE
"Anesthesia Release from PACU Note    Patient: Janeth Tamez    Procedure(s) Performed: Procedure(s) (LRB):  ESOPHAGOGASTRODUODENOSCOPY (EGD) (N/A)    Anesthesia type: general    Post pain: Adequate analgesia    Post assessment: no apparent anesthetic complications, tolerated procedure well and no evidence of recall    Last Vitals:   Visit Vitals  BP (!) 160/78 (BP Location: Right arm, Patient Position: Lying)   Pulse (!) 114   Temp 36.9 °C (98.5 °F) (Oral)   Resp 17   Ht 5' 4" (1.626 m)   Wt 81 kg (178 lb 9.2 oz)   LMP  (LMP Unknown)   SpO2 98%   Breastfeeding? No   BMI 30.65 kg/m²       Post vital signs: stable    Level of consciousness: awake, alert  and oriented    Nausea/Vomiting: no nausea/no vomiting    Complications: none    Airway Patency: patent    Respiratory: unassisted, spontaneous ventilation, room air    Cardiovascular: stable and blood pressure at baseline    Hydration: euvolemic  "

## 2017-10-05 NOTE — PLAN OF CARE
10/05/17 1241   Medicare Message   Important Message from Medicare regarding Discharge Appeal Rights Given to patient/caregiver;Explained to patient/caregiver;Signed/date by patient/caregiver   Date IMM was signed 10/05/17   Time IMM was signed [id number]

## 2017-10-06 VITALS
OXYGEN SATURATION: 98 % | BODY MASS INDEX: 30.48 KG/M2 | RESPIRATION RATE: 16 BRPM | HEIGHT: 64 IN | DIASTOLIC BLOOD PRESSURE: 74 MMHG | HEART RATE: 90 BPM | TEMPERATURE: 98 F | WEIGHT: 178.56 LBS | SYSTOLIC BLOOD PRESSURE: 160 MMHG

## 2017-10-06 PROBLEM — R13.19 ESOPHAGEAL DYSPHAGIA: Status: ACTIVE | Noted: 2017-10-03

## 2017-10-06 PROBLEM — K22.0 ACHALASIA: Status: ACTIVE | Noted: 2017-10-06

## 2017-10-06 PROBLEM — R13.19 ESOPHAGEAL DYSPHAGIA: Status: ACTIVE | Noted: 2017-10-06

## 2017-10-06 LAB
ALBUMIN SERPL BCP-MCNC: 2.3 G/DL
ALP SERPL-CCNC: 80 U/L
ALT SERPL W/O P-5'-P-CCNC: 7 U/L
ANION GAP SERPL CALC-SCNC: 8 MMOL/L
AST SERPL-CCNC: 8 U/L
BASOPHILS # BLD AUTO: 0.02 K/UL
BASOPHILS NFR BLD: 0.2 %
BILIRUB SERPL-MCNC: 0.8 MG/DL
BUN SERPL-MCNC: 11 MG/DL
CALCIUM SERPL-MCNC: 9.1 MG/DL
CHLORIDE SERPL-SCNC: 100 MMOL/L
CO2 SERPL-SCNC: 28 MMOL/L
CREAT SERPL-MCNC: 1.1 MG/DL
DIFFERENTIAL METHOD: ABNORMAL
EOSINOPHIL # BLD AUTO: 0.6 K/UL
EOSINOPHIL NFR BLD: 7.6 %
ERYTHROCYTE [DISTWIDTH] IN BLOOD BY AUTOMATED COUNT: 15.3 %
EST. GFR  (AFRICAN AMERICAN): 50 ML/MIN/1.73 M^2
EST. GFR  (NON AFRICAN AMERICAN): 43 ML/MIN/1.73 M^2
GLUCOSE SERPL-MCNC: 138 MG/DL
HCT VFR BLD AUTO: 36.4 %
HGB BLD-MCNC: 11.7 G/DL
LYMPHOCYTES # BLD AUTO: 2.3 K/UL
LYMPHOCYTES NFR BLD: 27.8 %
MCH RBC QN AUTO: 27.4 PG
MCHC RBC AUTO-ENTMCNC: 32.1 G/DL
MCV RBC AUTO: 85 FL
MONOCYTES # BLD AUTO: 1 K/UL
MONOCYTES NFR BLD: 11.9 %
NEUTROPHILS # BLD AUTO: 4.3 K/UL
NEUTROPHILS NFR BLD: 52.5 %
PLATELET # BLD AUTO: 173 K/UL
PMV BLD AUTO: 10.4 FL
POCT GLUCOSE: 127 MG/DL (ref 70–110)
POCT GLUCOSE: 130 MG/DL (ref 70–110)
POCT GLUCOSE: 132 MG/DL (ref 70–110)
POTASSIUM SERPL-SCNC: 3.4 MMOL/L
PROT SERPL-MCNC: 5.7 G/DL
RBC # BLD AUTO: 4.27 M/UL
SODIUM SERPL-SCNC: 136 MMOL/L
WBC # BLD AUTO: 8.27 K/UL

## 2017-10-06 PROCEDURE — 99900035 HC TECH TIME PER 15 MIN (STAT)

## 2017-10-06 PROCEDURE — C9113 INJ PANTOPRAZOLE SODIUM, VIA: HCPCS | Performed by: NURSE PRACTITIONER

## 2017-10-06 PROCEDURE — 92611 MOTION FLUOROSCOPY/SWALLOW: CPT

## 2017-10-06 PROCEDURE — 85025 COMPLETE CBC W/AUTO DIFF WBC: CPT

## 2017-10-06 PROCEDURE — 97802 MEDICAL NUTRITION INDIV IN: CPT

## 2017-10-06 PROCEDURE — 36415 COLL VENOUS BLD VENIPUNCTURE: CPT

## 2017-10-06 PROCEDURE — 63600175 PHARM REV CODE 636 W HCPCS: Performed by: NURSE PRACTITIONER

## 2017-10-06 PROCEDURE — 25000003 PHARM REV CODE 250: Performed by: NURSE PRACTITIONER

## 2017-10-06 PROCEDURE — 80053 COMPREHEN METABOLIC PANEL: CPT

## 2017-10-06 PROCEDURE — 96372 THER/PROPH/DIAG INJ SC/IM: CPT

## 2017-10-06 PROCEDURE — 99231 SBSQ HOSP IP/OBS SF/LOW 25: CPT | Mod: ,,, | Performed by: NURSE PRACTITIONER

## 2017-10-06 PROCEDURE — 27000221 HC OXYGEN, UP TO 24 HOURS

## 2017-10-06 RX ORDER — SULFAMETHOXAZOLE AND TRIMETHOPRIM 200; 40 MG/5ML; MG/5ML
20 SUSPENSION ORAL EVERY 12 HOURS
Status: DISCONTINUED | OUTPATIENT
Start: 2017-10-06 | End: 2017-10-06 | Stop reason: HOSPADM

## 2017-10-06 RX ORDER — METOPROLOL TARTRATE 25 MG/1
25 TABLET, FILM COATED ORAL 2 TIMES DAILY
Start: 2017-10-06

## 2017-10-06 RX ORDER — SULFAMETHOXAZOLE AND TRIMETHOPRIM 200; 40 MG/5ML; MG/5ML
20 SUSPENSION ORAL EVERY 12 HOURS
Qty: 280 ML | Refills: 0
Start: 2017-10-06 | End: 2017-10-13

## 2017-10-06 RX ADMIN — HEPARIN SODIUM 5000 UNITS: 5000 INJECTION, SOLUTION INTRAVENOUS; SUBCUTANEOUS at 05:10

## 2017-10-06 RX ADMIN — SULFAMETHOXAZOLE AND TRIMETHOPRIM 20 ML: 200; 40 SUSPENSION ORAL at 02:10

## 2017-10-06 RX ADMIN — CIPROFLOXACIN 400 MG: 2 INJECTION, SOLUTION INTRAVENOUS at 02:10

## 2017-10-06 RX ADMIN — SODIUM CHLORIDE: 0.9 INJECTION, SOLUTION INTRAVENOUS at 05:10

## 2017-10-06 RX ADMIN — METOPROLOL TARTRATE 2.5 MG: 5 INJECTION INTRAVENOUS at 08:10

## 2017-10-06 RX ADMIN — HEPARIN SODIUM 5000 UNITS: 5000 INJECTION, SOLUTION INTRAVENOUS; SUBCUTANEOUS at 02:10

## 2017-10-06 RX ADMIN — PANTOPRAZOLE SODIUM 40 MG: 40 INJECTION, POWDER, FOR SOLUTION INTRAVENOUS at 08:10

## 2017-10-06 NOTE — PLAN OF CARE
Discharge orders noted, faxed to [facility] via [facility]. Spoke with Selene Hutchinson Admissions at [place]. Once orders are reviewed she will call with the transportation arrangements    Call received from Nelda Alcaraz Intake at [facility] transportation has been arranged for [phone]# for report is [phone] . Update and # for report given to Shadia james nurse. Also contacted patient's spouse Rafael regarding patient's discharge.

## 2017-10-06 NOTE — SUBJECTIVE & OBJECTIVE
Subjective:     Interval History: Patient underwent EGD yesterday and >1L of food stuff was removed from esophagus. There was dilation in the entire esophagus that is consistent with end stage achalasia. Discussion was had between Dr. Berman and patient's  and daugher. The decision was made that she would advance PO diet to clear liquids with Boost or Ensure due to achalasia. No family is at bedside. Patient seems comfortable. No acute events overnight.     Review of Systems   Unable to perform ROS: Dementia   Constitutional:        As per interval history above     Objective:     Vital Signs (Most Recent):  Temp: 97.6 °F (36.4 °C) (10/06/17 0750)  Pulse: 91 (10/06/17 0750)  Resp: 16 (10/06/17 0750)  BP: (!) 142/64 (10/06/17 0750)  SpO2: 96 % (10/06/17 0750) Vital Signs (24h Range):  Temp:  [97.3 °F (36.3 °C)-99 °F (37.2 °C)] 97.6 °F (36.4 °C)  Pulse:  [] 91  Resp:  [0-46] 16  SpO2:  [95 %-98 %] 96 %  BP: (134-173)/(64-86) 142/64     Weight: 81 kg (178 lb 9.2 oz) (10/05/17 1302)  Body mass index is 30.65 kg/m².      Intake/Output Summary (Last 24 hours) at 10/06/17 1201  Last data filed at 10/06/17 0900   Gross per 24 hour   Intake             2905 ml   Output                0 ml   Net             2905 ml       Lines/Drains/Airways     Airway                 Airway - Non-Surgical 10/05/17 1359 Endotracheal Tube less than 1 day          Peripheral Intravenous Line                 Peripheral IV - Single Lumen 10/05/17 1312 Right Forearm less than 1 day                Physical Exam   Constitutional: She appears well-developed.   Cardiovascular: Normal rate and regular rhythm.  Exam reveals no friction rub.    No murmur heard.  Pulmonary/Chest: Effort normal and breath sounds normal. No respiratory distress. She has no wheezes.   Abdominal: Soft. Bowel sounds are normal. She exhibits no distension. There is no tenderness.   Neurological:   confused   Skin: Skin is warm and dry.       Significant  Labs:  CBC:   Recent Labs  Lab 10/05/17  0438 10/06/17  0435   WBC 10.45 8.27   HGB 13.2 11.7*   HCT 40.6 36.4*    173     CMP:   Recent Labs  Lab 10/06/17  0435   *   CALCIUM 9.1   ALBUMIN 2.3*   PROT 5.7*      K 3.4*   CO2 28      BUN 11   CREATININE 1.1   ALKPHOS 80   ALT 7*   AST 8*   BILITOT 0.8         Significant Imaging:  Imaging results within the past 24 hours have been reviewed.

## 2017-10-06 NOTE — PLAN OF CARE
Problem: Patient Care Overview  Goal: Plan of Care Review  Outcome: Ongoing (interventions implemented as appropriate)  Fall prevention precautions maintained, pt remained free of falls throughout shift, call bell and personal items within reach, POCT glucose monitored, pt turned Q2 hours. 24 hour chart check completed. Will continue to monitor

## 2017-10-06 NOTE — DISCHARGE SUMMARY
"[facility] Hospital Medicine  Discharge Summary      Patient Name: Janeth Tamez  MRN: [id number]  Admission Date: 10/3/2017  Hospital Length of Stay: 3 days  Discharge Date and Time:  10/06/2017 3:05 PM  Attending Physician: Jenelle Harris MD   Discharging Provider: Chrystal Coon NP  Primary Care Provider: Primary Doctor Carine      HPI:   Janeth Tamez is a 89 y.o. female patient , with history of dementia, who presented to the Emergency Department for AMS which onset at 11:30 AM. Symptoms are constant and moderate in severity. Per [facility], pt is not at her baseline. Per pt's family, pt has had a stroke before and she began to act strangely today after, Pt was coughing up "slime", SOB, and in distress. The patient was sent from [facility] for further treatment. The patient was evaluated in the ER and found to have a UTI, was treated and being discharged, when the patient began to vomit after oral intake in the Er. The patient was re evaluated by Dr. Kim evaluated. Dr. Kim discussed with the patient's  possible forms of treatment, including placement of a G Tube. Pt's  verbalizes consent at this time for G Tube placement. Dr. Kim discussed the pt's case with Dr. Clark (GI) who states that pt's sxs are most likely related to achalasia or a disrupted esophagus, in which case a feeding tube will need to be placed by interventional radiology. Dr. Clark states that he will f/u with the pt in the AM..   No further complaints or concerns at this time. HPI limited due to pt AMS and dementia.      Procedure(s) (LRB):  ESOPHAGOGASTRODUODENOSCOPY (EGD) (N/A)      Indwelling Lines/Drains at time of discharge:   Lines/Drains/Airways     Airway                 Airway - Non-Surgical 10/05/17 1359 Endotracheal Tube 1 day              Hospital Course:   The pt with advanced Dementia was admitted with severe dysphagia. CT chest showed Severe/massive dilatation of the " esophagus filled with food stuff. Pt is NPO for now. She has dementia. Spouse at bedside. Care discussed with GI who felt pt had sevee Achalasia. GI performed EGD which showed severely dilated and filled esophagus from UES to LES with old food. This was removed with lavage and suction. About 1L of old food was removed from the esophagus. The LES was tight but the scope was able to traverse this. Botox was injected into the LES. GI recommended liquid diet. Speech then performed a MBSS and recommended the same due to reduced laryngeal elevation and LES dysfunction. This was extensiveley explained to the pt's family. The pt ws also noted to have a UTI with urine culture showing 10,000 - 49,999 cfu/ml STAPHYLOCOCCUS AUREUS. Susceptibility pending. Will discharge pt on oral liquid Bactrim. Pt will need all medications crushed and dissolved in liquid. The pt was seen and examined today and determined to be stable for discharge.      Consults:   Consults         Status Ordering Provider     Inpatient consult to Gastroenterology  Once     Provider:  Jay Clark III, MD    Completed JEFFREY SALMERON JR     Inpatient consult to Social Work  Once     Provider:  (Not yet assigned)    Completed ADALID JIMENEZ      consult to case management  Once     Provider:  (Not yet assigned)    Completed JUANITO GLAVIN          Significant Diagnostic Studies:   Imaging Results          Fl Modified Barium Swallow Speech (Final result)  Result time 10/06/17 11:36:33    Final result by Adalid Dailey MD (10/06/17 11:36:33)                 Impression:      Videofluoroscopy swallowing study performed.    Please see speech pathologist report for complete details.  Total fluoro time was 4.6 min.      Electronically signed by: ADALID DAILEY MD  Date:     10/06/17  Time:    11:36              Narrative:    History:  Dysphagia    Findings                             X-Ray Chest 1 View (Final result)  Result time 10/04/17 08:28:01   Procedure  changed from Chest X-ray, PA And Lateral     Final result by Kj Rizvi MD (10/04/17 08:28:01)                 Impression:     No adverse interval change      Electronically signed by: KJ RIZVI MD  Date:     10/04/17  Time:    08:28              Narrative:    History: Aspiration risk    Comparison: 10/3/17    Result: Single view of the chest.       Right paratracheal stripe thickening which correlates with large amount of material within the esophagus on CT exam. In comparison to the prior study, there is no adverse interval changes.                             CTA Chest Non-Coronary (Final result)  Result time 10/03/17 18:37:25    Final result by Kj Guadalupe III, MD (10/03/17 18:37:25)                 Impression:        1. Severe/massive dilatation of the esophagus filled with food stuff. This is a chronic finding.    2. No gross evidence of acute pulmonary emboli. There is moderately pronounced atheromatous change along the aorta without gross evidence of aneurysm formation.    3. Otherwise as above.      Electronically signed by: KJ GUADALUPE MD  Date:     10/03/17  Time:    18:37              Narrative:    CT angiogram of the chest.    Clinical indication: Shortness of breath.    Standard and MIPS/3-D images submitted.    There is severe/massive dilatation of the esophagus filled with food stuff. This is a chronic finding that has been demonstrated previously.    There is rather pronounced atheromatous change was scattered plaque formation along the aorta. No evidence of aneurysm formation or terri dissection.    There is opacification of the pulmonary arterial system without gross dilatation. There no definite filling defects within the major branches to suggest emboli.    The lungs show chronic changes bilaterally. Cannot exclude mild basilar congestion. Mild scarring. Cardiomegaly noted.    Within the upper portion of the abdomen, there are multiple low attenuation foci in the liver,  incompletely seen and severely streak artifact but probably cysts. No acute bony abnormality is suggested.                             X-Ray Chest AP Portable (Final result)  Result time 10/03/17 16:01:31    Final result by Diego Hugo MD (10/03/17 16:01:31)                 Impression:     Superior mediastinal mass as above.      Electronically signed by: DIEGO HUGO MD  Date:     10/03/17  Time:    16:01              Narrative:    History: Cough    Normal heart size.  Prominent curvilinear opacity along the right superior mediastinum is in a location where previously there was markedly air distended esophagus on multiple prior studies, most recent a 3/4/15 chest x-ray.  This is suspicious for mass/superior mediastinal lymphadenopathy, but conceivably could be a markedly distended fluid filled esophagus.  Lung fields otherwise clear.                             CT Head Without Contrast (Final result)  Result time 10/03/17 15:06:15   Procedure changed from CT Head W Wo Contrast     Final result by Darlin Monae MD (10/03/17 15:06:15)                 Impression:      Advanced atrophy with white matter degeneration.  No acute findings.        All CT scans at this facility use dose modulation, iterative reconstruction and/or weight based dosing when appropriate to reduce radiation dose to as low as reasonably achievable.       Electronically signed by: DARLIN MONAE MD  Date:     10/03/17  Time:    15:06              Narrative:    CT HEAD WITHOUT CONTRAST     History:  stroke    Technique:  Noncontrast CT of the brain.     Comparison: 03/05/2012.    Findings:  The ventricles are dilated consistent with generalized atrophy. Associated age-related white matter degeneration is present.     No significant acute findings are noted.                              Pending Diagnostic Studies:     None        Final Active Diagnoses:    Diagnosis Date Noted POA    Esophageal obstruction [K22.2] 10/03/2017 Yes     Esophageal dysphagia [R13.10] 10/03/2017 Unknown    Dementia [F03.90] 10/03/2017 Yes    HTN (hypertension) [I10] 10/03/2017 Yes    Acute cystitis due to Staph aureus  [N30.00] 10/03/2017 Yes    Achalasia [K22.0] 10/03/2017 Unknown    DM (diabetes mellitus) [E11.9] 07/13/2014 Yes      Problems Resolved During this Admission:    Diagnosis Date Noted Date Resolved POA          Discharged Condition: stable    Disposition: Nursing Facility    Follow Up:  Follow-up Information     PCP In 3 days.    Why:  pt will make non-[facility] appt  Contact information:  [phone]           [facility]    Specialty:  Rehabilitation  Why:  Nursing Home  Contact information:  [phone] [place] [address] [phone]                 Patient Instructions:     Diet general   Scheduling Instructions: Liquid Diabetic diet only  Glucerna 4 cans a day ( at meal times and bedtime) with 100ml water 4 times per day     Activity as tolerated       Medications:  Reconciled Home Medications:   Current Discharge Medication List      START taking these medications    Details   sulfamethoxazole-trimethoprim 200-40 mg/5 ml (BACTRIM,SEPTRA) 200-40 mg/5 mL Susp Take 20 mLs by mouth every 12 (twelve) hours.  Qty: 280 mL, Refills: 0         CONTINUE these medications which have CHANGED    Details   metoprolol tartrate (LOPRESSOR) 25 MG tablet Take 1 tablet (25 mg total) by mouth 2 (two) times daily.         CONTINUE these medications which have NOT CHANGED    Details   albuterol (ACCUNEB) 1.25 mg/3 mL Nebu Take 2.5 mg by nebulization every 6 (six) hours as needed.       amlodipine (NORVASC) 5 MG tablet Take 1 tablet (5 mg total) by mouth once daily.  Qty: 30 tablet, Refills: 0      ascorbic acid (VITAMIN C) 500 MG tablet Take 500 mg by mouth 2 (two) times daily.      ASPIRIN ORAL Take 81 mg by mouth once daily.       atorvastatin (LIPITOR) 20 MG tablet Take 1 tablet (20 mg total) by mouth every evening.  Qty: 30 tablet,  Refills: 0      cyanocobalamin, vitamin B-12, 1,000 mcg TbSR Take by mouth once daily.      loratadine (CLARITIN) 10 mg tablet Take 10 mg by mouth once daily.      paroxetine (PAXIL) 20 MG tablet Take 10 mg by mouth every morning.       POLYETHYLENE GLYCOL 3350 (MIRALAX ORAL) Take by mouth.      tramadol (ULTRAM) 50 mg tablet Take 50 mg by mouth every 8 (eight) hours as needed for Pain.      hydrocodone-acetaminophen 5-325mg (NORCO) 5-325 mg per tablet Take 1 tablet by mouth every 4 (four) hours as needed for Pain.  Qty: 18 tablet, Refills: 0         STOP taking these medications       hydrOXYzine HCl (ATARAX) 25 MG tablet Comments:   Reason for Stopping:         lorazepam (ATIVAN) 1 MG tablet Comments:   Reason for Stopping:         losartan (COZAAR) 50 MG tablet Comments:   Reason for Stopping:         PROTEIN SUPPLEMENT (PROMOD PROTEIN ORAL) Comments:   Reason for Stopping:         senna (SENOKOT) 8.6 mg tablet Comments:   Reason for Stopping:             Time spent on the discharge of patient: 40 minutes      Chrystal Coon NP  Department of Hospital Medicine  [facility]

## 2017-10-06 NOTE — PLAN OF CARE
Problem: Patient Care Overview  Goal: Plan of Care Review  Outcome: Ongoing (interventions implemented as appropriate)  Recommendations     Recommendation/Intervention: 1. Advance diet when medically able to 1600 calories Diabetic Cardiac. 2.  If patient is to remain on clear liquid diet greater than 2 additional days consider nutrition support to meet needs. PPN Clinimix E Amino Acids 4.25 % Dextrose 10 % at 80 ml/hr with 250 ml 20 % lipids daily to provide 1481 calories, 82 g protein, 1.64 mg/kg/min dextrose infusion rate.  3. Will continue to monitor.   Goals: Diet advancement within 48 hrs   Nutrition Goal Status: new  Communication of RD Recs:  (care plan and sticky note)

## 2017-10-06 NOTE — PROCEDURES
Modifed Barium Swallow Study  Speech Start Time: 1000  Speech Stop Time: 1025  Speech Total (min): 25 min    SLP Treatment Date: 10/06/17    Reason for Referral  Patient was referred for a Modified Barium Swallow Study to assess the efficiency of his/her swallow function, rule out aspiration and make recommendations regarding safe dietary consistencies, effective compensatory strategies, and safe eating environment.     Diagnosis   Dysphagia    Past Medical History:   Diagnosis Date    Arthritis     Degenerative joint disease     Dementia     Diabetes mellitus     HBP (high blood pressure)     Hepatitis C     History of congestive heart failure     History of frequent urinary tract infections     Peripheral neuropathy     Pulmonary embolism      Past Surgical History:   Procedure Laterality Date    2 abdominal surgeries      atopic pregnancy      CATARACT EXTRACTION W/  INTRAOCULAR LENS IMPLANT  OD 10/30/13    CATARACT EXTRACTION W/  INTRAOCULAR LENS IMPLANT  OS 10/22/14    GALLBLADDER SURGERY      IV filter for pulmonary embolism      TONSILLECTOMY, ADENOIDECTOMY          General Precautions: aspiration       Recommendations    PO diet of small sips of thin liquids taken in small amount throughout the day  Oral Peripheral Examination  Oral Musculature Evaluation  Oral Musculature: general weakness  Dentition: edentulous    Consistencies Assessed  Thin liquids via straw, Puree via spoon and Soft solids bites of cracker    Oral Preparation / Oral Phase  Decreased mastication    Pharyngeal Phase  Enlarged upper 2/3 of esophagus  Decreased laryngeal elevation with trace aspiration of solids and vallecular residue without second swallow triggered  Cervical Esophageal Phase    Enlarged upper 2/3 of esophagus with narrowing of lower 1/3  Decreased clearance of bolus from esophagus with increased stasis with consecutive bites.  Impressions  Pt presents with oropharyngeal and esophageal dysphagia  characterized by reduced laryngeal elevation and LES dysfunction. Pt was unable to clear vallecular residue of solids and aspirated a trace amount of residue. Esophageal screening revealed poor clearance of liquids and solids from esophagus. Thin liquids mostly clear when given a few sips at a time. Esophageal stasis increased with po intake of puree and solids given several consecutive bites.    Prognosis/Plan/Education    Care Plan    SLP Goals     Not on file                 G-Codes

## 2017-10-06 NOTE — PROGRESS NOTES
Discharged instructions given to [facility] faculty. IV removed and cath intact. Discharged via wheelchair.

## 2017-10-06 NOTE — PLAN OF CARE
Problem: Patient Care Overview  Goal: Plan of Care Review  Outcome: Ongoing (interventions implemented as appropriate)  Safety precautions maintained. Afebrile. Pain controlled. To be discharged to facility. 12 hour chart check.

## 2017-10-06 NOTE — ASSESSMENT & PLAN NOTE
Patient is S/P EGD yesterday  >1L of food stuff was removed from esophagus. Botox was injected to the LES  Recommendation of G tube placement but family declined. Extensive discussion was had on numerous occasions about patient condition and expected outcomes. Family requesting continued PO feedings with clear liquids despite the risk of aspiration.   Changed diet to clear liquids.

## 2017-10-06 NOTE — PROGRESS NOTES
[facility] -   Gastroenterology  Progress Note    Patient Name: Janeth Tamez  MRN: [id number]  Admission Date: 10/3/2017  Hospital Length of Stay: 3 days  Code Status: DNR   Attending Provider: Jenelle Harris MD  Consulting Provider: Vangie Iqbal NP  Primary Care Physician: Primary Doctor No  Principal Problem: Dysphagia      Subjective:     Interval History: Patient underwent EGD yesterday and >1L of food stuff was removed from esophagus. There was dilation in the entire esophagus that is consistent with end stage achalasia. Discussion was had between Dr. Berman and patient's  and daugher. The decision was made that she would advance PO diet to clear liquids with Boost or Ensure due to achalasia. No family is at bedside. Patient seems comfortable. No acute events overnight.     Review of Systems   Unable to perform ROS: Dementia   Constitutional:        As per interval history above     Objective:     Vital Signs (Most Recent):  Temp: 97.6 °F (36.4 °C) (10/06/17 0750)  Pulse: 91 (10/06/17 0750)  Resp: 16 (10/06/17 0750)  BP: (!) 142/64 (10/06/17 0750)  SpO2: 96 % (10/06/17 0750) Vital Signs (24h Range):  Temp:  [97.3 °F (36.3 °C)-99 °F (37.2 °C)] 97.6 °F (36.4 °C)  Pulse:  [] 91  Resp:  [0-46] 16  SpO2:  [95 %-98 %] 96 %  BP: (134-173)/(64-86) 142/64     Weight: 81 kg (178 lb 9.2 oz) (10/05/17 1302)  Body mass index is 30.65 kg/m².      Intake/Output Summary (Last 24 hours) at 10/06/17 1201  Last data filed at 10/06/17 0900   Gross per 24 hour   Intake             2905 ml   Output                0 ml   Net             2905 ml       Lines/Drains/Airways     Airway                 Airway - Non-Surgical 10/05/17 1359 Endotracheal Tube less than 1 day          Peripheral Intravenous Line                 Peripheral IV - Single Lumen 10/05/17 1312 Right Forearm less than 1 day                Physical Exam   Constitutional: She appears well-developed.   Cardiovascular: Normal rate and  regular rhythm.  Exam reveals no friction rub.    No murmur heard.  Pulmonary/Chest: Effort normal and breath sounds normal. No respiratory distress. She has no wheezes.   Abdominal: Soft. Bowel sounds are normal. She exhibits no distension. There is no tenderness.   Neurological:   confused   Skin: Skin is warm and dry.       Significant Labs:  CBC:   Recent Labs  Lab 10/05/17  0438 10/06/17  0435   WBC 10.45 8.27   HGB 13.2 11.7*   HCT 40.6 36.4*    173     CMP:   Recent Labs  Lab 10/06/17  0435   *   CALCIUM 9.1   ALBUMIN 2.3*   PROT 5.7*      K 3.4*   CO2 28      BUN 11   CREATININE 1.1   ALKPHOS 80   ALT 7*   AST 8*   BILITOT 0.8         Significant Imaging:  Imaging results within the past 24 hours have been reviewed.    Assessment/Plan:     * Dysphagia    Patient is S/P EGD yesterday  >1L of food stuff was removed from esophagus. Botox was injected to the LES  Recommendation of G tube placement but family declined. Extensive discussion was had on numerous occasions about patient condition and expected outcomes. Family requesting continued PO feedings with clear liquids despite the risk of aspiration.   Changed diet to clear liquids.         Dementia    Patient has chronic dementia and is a resident of a nursing facility             Thank you for your consult. I will sign off. Please contact us if you have any additional questions.    Vangie Iqbal NP  Gastroenterology  [facility]

## 2017-10-06 NOTE — PROGRESS NOTES
[facility] -   Adult Nutrition  Consult Note    SUMMARY     Recommendations    Recommendation/Intervention: 1. Advance diet when medically able to 1600 calories Diabetic Cardiac. 2.  If patient is to remain on clear liquid diet greater than 2 additional days consider nutrition support to meet needs. PPN Clinimix E Amino Acids 4.25 % Dextrose 10 % at 80 ml/hr with 250 ml 20 % lipids daily to provide 1481 calories, 82 g protein, 1.64 mg/kg/min dextrose infusion rate.  3. Will continue to monitor.   Goals: Diet advancement within 48 hrs   Nutrition Goal Status: new  Communication of RD Recs:  (care plan and sticky note)      Reason for Assessment    Reason for Assessment: RD follow-up     Diagnosis:  1. SOB (shortness of breath)    2. Urinary tract infection with hematuria, site unspecified    3. Achalasia    4. Esophageal dysphagia    5. Esophageal obstruction    6. Encephalopathy    7. Dysphagia    8. Esophageal dysphagia    9. Achalasia    10. Dementia with behavioral disturbance, unspecified dementia type    Hx:  Past Medical History:   Diagnosis Date    Arthritis     Degenerative joint disease     Dementia     Diabetes mellitus     HBP (high blood pressure)     Hepatitis C     History of congestive heart failure     History of frequent urinary tract infections     Peripheral neuropathy     Pulmonary embolism      General Information Comments: Pt just advanced to clear liquid diet. Per SLP recs: PO diet of small sips of thin liquids taken in small amount through the day.      Nutrition Discharge Planning: Diabetic Cardiac diet     Nutrition Prescription Ordered    Current Diet Order: Clear liquid diet        Evaluation of Received Nutrients/Fluid Intake    Energy Calories Required: not meeting needs     Protein Required: not meeting needs         % Intake of Estimated Energy Needs: Patient just advanced to clear liquid diet  % Meal Intake: Other: Patient just advanced to clear liquid diet  "    Nutrition Risk Screen     Nutrition Risk Screen: dysphagia or difficulty swallowing    Nutrition/Diet History    Patient Reported Diet/Restrictions/Preferences:  ([place] [facility])     Food Preferences: No cultural or Protestant food preferences identified.        Labs/Tests/Procedures/Meds       Pertinent Labs Reviewed: reviewed  Pertinent Labs Comments: K 3.4, GFR 43, Glu 138, Alb 2.3, AST 8, ALT 7  Pertinent Medications Reviewed: reviewed  Pertinent Medications Comments: Heparin, pantoprazole, metoprolol    Physical Findings    Overall Physical Appearance: nourished     Oral/Mouth Cavity:  (tooth/teeth missing)  Skin:  (Travis Score 12)    Anthropometrics    Temp: 97.6 °F (36.4 °C)     Height: 5' 4" (162.6 cm)  Weight Method: Bed Scale  Weight: 81 kg (178 lb 9.2 oz)     Ideal Body Weight (IBW), Female: 120 lb     % Ideal Body Weight, Female (lb): 148.81 lb  BMI (Calculated): 30.7  BMI Grade: 25 - 29.9 - overweight          Estimated/Assessed Needs    Weight Used For Calorie Calculations: 81 kg (178 lb 9.2 oz)      Energy Calorie Requirements (kcal): 1493 kcal/d  Energy Need Method: [place]-St Jeor (x 1.25)     RMR ([place]-St. Jeor Equation): 1195.25     Weight Used For Protein Calculations: 81 kg (178 lb 9.2 oz)  0.8 gm Protein (gm): 64.94 and 1.2 gm Protein (gm): 97.4     Fluid Need Method: RDA Method        RDA Method (mL): 1493         CHO Requirement: 187 gm/d     Assessment and Plan    Nutrition Problem:  Inadequate energy intake    Related to (etiology):   Current diet     Signs and Symptoms (as evidenced by):   Pt on clear liquid diet not meeting needs.     Interventions/Recommendations (treatment strategy):  See above    Nutrition Diagnosis Status:   New      Monitor and Evaluation    Food and Nutrient Intake: energy intake  Food and Nutrient Adminstration: diet order  Knowledge/Beliefs/Attitudes: food and nutrition knowledge/skill  Anthropometric Measurements: weight  Biochemical Data, Medical Tests and " Procedures: electrolyte and renal panel, glucose/endocrine profile  Nutrition-Focused Physical Findings: overall appearance    Nutrition Risk    Level of Risk:  (f/u 2x/weekly)    Nutrition Follow-Up    RD Follow-up?: Yes (2xweekly)

## 2017-10-07 LAB — BACTERIA UR CULT: NORMAL

## 2017-10-07 NOTE — PLAN OF CARE
10/07/17 0838   Final Note   Assessment Type Final Discharge Note   Discharge Disposition [facility]  ([facility])

## 2017-10-09 ENCOUNTER — TELEPHONE (OUTPATIENT)
Dept: GASTROENTEROLOGY | Facility: CLINIC | Age: 82
End: 2017-10-09

## 2017-10-09 NOTE — TELEPHONE ENCOUNTER
Returned call to Clau. Confirmed no one from [facility] called Mr. Tamez to let him know his wife missed apt today.

## 2017-10-09 NOTE — TELEPHONE ENCOUNTER
----- Message from Michelle Mendez sent at 10/9/2017  2:05 PM CDT -----  Contact: [facility]-nursing director  Would like to consult with nurse regarding a f/u appointment for the patient. Please call back at [phone].      Thanks,  Michelle Mendez

## 2017-10-10 ENCOUNTER — TELEPHONE (OUTPATIENT)
Dept: GASTROENTEROLOGY | Facility: CLINIC | Age: 82
End: 2017-10-10

## 2017-10-10 NOTE — TELEPHONE ENCOUNTER
Informed Clau that [facility] automated scheduling system did accidentally call the [facility]. Let her know we were working on making the automated system better and apologized for the inconvenience. She verbalized understanding.

## 2020-10-20 NOTE — PROGRESS NOTES
"Wound Care Screen completed due to documented Travis Scale score <=14.  Skin assessment completed at this time. Bilateral heels and sacrum/coccyx/bilateral buttock intact with no breakdown noted.  .  Wound care recommends the following for Pressure Injury Prevention:  Skin Care Precautions / Pressure Injury Prevention:  1. Follow "Guidelines for Prevention of Pressure Ulcers in At Risk Patients"  These guidelines can be found on the [facility] Intr[employer]et by searching "Wound Care / Ostomy Resources"  2. Document wound assessment in [facility] using guidelines in Priyanka's "Assessment : Wound" procedure  3. Limit the amount of linen/underpad between patient and mattress surface to ONE fitted sheet and ONE covidien underpad - NO draw sheet/briefs.  4. Obtain Easi Cleans Foam Wipes for providing jose care - avoid the use of wash cloths to areas affected by IAD.  5. Apply Clear Barrier Ointment to perineal / perirectal areas in a thin even layer to clean dry skin BID and after each episode of pericare  6. Apply sween 24 moisturizer cream to all dry skin after daily bath and prn  7. Obtain foam wedge from materials management to assist with maintaining proper position changes at least q 2hours and document actual position in EPIC q 2hours  8. Elevate heels off mattress on 2 separate pillows placed lengthwise under each leg supporting the leg from knee to ankle.  Document in EPIC flow sheet every 2 hours.  9. .Do NOT elevate HOB greater than 30 degrees unless contraindicated.  10. Remove SCD/Plexi Pulses/RUPAL's every 12 hours for 30 minutes and assess skin underneath these devices for breakdown              " (2) cough or sneeze

## 2020-11-17 NOTE — PLAN OF CARE
Problem: Patient Care Overview  Goal: Plan of Care Review  Outcome: Ongoing (interventions implemented as appropriate)  POC reviewed with spouse and step-daughter, as patient has dementia and is confused. Pt remained free of injury during shift, bed alarm set, stable condition, pain adequately controlled, no acute distress, receiving IV fluids, remained NPO, HOB kept at 30 per order, receiving antibiotics, blood glucose monitoring performed, on cardiac monitoring (SR, HR 70-80s), and will continue to monitor. 24hr chart review performed.         negative